# Patient Record
Sex: MALE | Race: WHITE | NOT HISPANIC OR LATINO | Employment: FULL TIME | URBAN - METROPOLITAN AREA
[De-identification: names, ages, dates, MRNs, and addresses within clinical notes are randomized per-mention and may not be internally consistent; named-entity substitution may affect disease eponyms.]

---

## 2017-05-07 ENCOUNTER — OFFICE VISIT (OUTPATIENT)
Dept: URGENT CARE | Facility: MEDICAL CENTER | Age: 52
End: 2017-05-07
Payer: COMMERCIAL

## 2017-05-07 PROCEDURE — 94640 AIRWAY INHALATION TREATMENT: CPT

## 2017-05-07 PROCEDURE — 99283 EMERGENCY DEPT VISIT LOW MDM: CPT

## 2017-05-07 PROCEDURE — G0382 LEV 3 HOSP TYPE B ED VISIT: HCPCS

## 2019-07-02 ENCOUNTER — OFFICE VISIT (OUTPATIENT)
Dept: URGENT CARE | Facility: MEDICAL CENTER | Age: 54
End: 2019-07-02
Payer: COMMERCIAL

## 2019-07-02 VITALS
RESPIRATION RATE: 20 BRPM | WEIGHT: 138.2 LBS | DIASTOLIC BLOOD PRESSURE: 75 MMHG | OXYGEN SATURATION: 99 % | HEART RATE: 128 BPM | HEIGHT: 69 IN | TEMPERATURE: 102.9 F | BODY MASS INDEX: 20.47 KG/M2 | SYSTOLIC BLOOD PRESSURE: 149 MMHG

## 2019-07-02 DIAGNOSIS — R50.9 FEVER, UNSPECIFIED FEVER CAUSE: Primary | ICD-10-CM

## 2019-07-02 PROCEDURE — 99213 OFFICE O/P EST LOW 20 MIN: CPT | Performed by: NURSE PRACTITIONER

## 2019-07-02 RX ORDER — IBUPROFEN 600 MG/1
600 TABLET ORAL ONCE
Status: DISCONTINUED | OUTPATIENT
Start: 2019-07-02 | End: 2019-07-02

## 2019-07-02 RX ORDER — IBUPROFEN 400 MG/1
400 TABLET ORAL ONCE
Status: COMPLETED | OUTPATIENT
Start: 2019-07-02 | End: 2019-07-02

## 2019-07-02 RX ADMIN — IBUPROFEN 400 MG: 400 TABLET ORAL at 08:22

## 2019-07-02 NOTE — LETTER
July 2, 2019     Patient: Tino Ramos   YOB: 1965   Date of Visit: 7/2/2019       To Whom it May Concern:    Bianka Dowling was seen in my clinic on 7/2/2019  He may return to work on 7/4/2019  If you have any questions or concerns, please don't hesitate to call  Sincerely,          MARY Riggs        CC: Jonathan Mosher

## 2019-07-02 NOTE — PATIENT INSTRUCTIONS
Alternate Tylenol and Motrin for fever  Tylenol is every 4 hours  Motrin is every 6 hours  Increase your fluid intake   If symptoms worsen, return to [facility] or go to the ER    Viral Syndrome   WHAT YOU NEED TO KNOW:   Viral syndrome is a term used for a viral infection that has no clear cause  Viruses are spread easily from person to person through the air and on shared items  DISCHARGE INSTRUCTIONS:   Call 911 for the following:   · You have a seizure  · You cannot be woken  · You have chest pain or trouble breathing  Return to the emergency department if:   · You have a stiff neck, a bad headache, and sensitivity to light  · You feel weak, dizzy, or confused  · You stop urinating or urinate a lot less than normal      · You cough up blood or thick, yellow or green, mucus  · You have severe abdominal pain or your abdomen is larger than usual   Contact your healthcare provider if:   · Your symptoms do not get better with treatment, or get worse, after 3 days  · You have a rash or ear pain  · You have burning when you urinate  · You have questions or concerns about your condition or care  Medicines: You may  need any of the following:  · Acetaminophen  decreases pain and fever  It is available without a doctor's order  Ask how much medicine to take and how often to take it  Follow directions  Acetaminophen can cause liver damage if not taken correctly  · NSAIDs , such as ibuprofen, help decrease swelling, pain, and fever  NSAIDs can cause stomach bleeding or kidney problems in certain people  If you take blood thinner medicine, always ask your healthcare provider if NSAIDs are safe for you  Always read the medicine label and follow directions  · Cold medicine  helps decrease swelling, control a cough, and relieve chest or nasal congestion  · Saline nasal spray  helps decrease nasal congestion  · Take your medicine as directed    Contact your healthcare provider if you think your medicine is not helping or if you have side effects  Tell him of her if you are allergic to any medicine  Keep a list of the medicines, vitamins, and herbs you take  Include the amounts, and when and why you take them  Bring the list or the pill bottles to follow-up visits  Carry your medicine list with you in case of an emergency  Manage your symptoms:   · Drink liquids as directed  to prevent dehydration  Ask how much liquid to drink each day and which liquids are best for you  Ask if you should drink an oral rehydration solution (ORS)  An ORS has the right amounts of water, salts, and sugar you need to replace body fluids  This may help prevent dehydration caused by vomiting or diarrhea  Do not drink liquids with caffeine  Drinks with caffeine can make dehydration worse  · Get plenty of rest  to help your body heal  Take naps throughout the day  Ask your healthcare provider when you can return to work and your normal activities  · Use a cool mist humidifier  to help you breathe easier if you have nasal or chest congestion  Ask your healthcare provider how to use a cool mist humidifier  · Eat honey or use cough drops  to help decrease throat discomfort  Ask your healthcare provider how much honey you should eat each day  Cough drops are available without a doctor's order  Follow directions for taking cough drops  · Do not smoke and stay away from others who smoke  Nicotine and other chemicals in cigarettes and cigars can cause lung damage  Smoking can also delay healing  Ask your healthcare provider for information if you currently smoke and need help to quit  E-cigarettes or smokeless tobacco still contain nicotine  Talk to your healthcare provider before you use these products  · Wash your hands frequently  to prevent the spread of germs to others  Use soap and water  Use gel hand  when soap and water are not available   Wash your hands after you use the bathroom, cough, or sneeze  Wash your hands before you prepare or eat food  Follow up with your healthcare provider as directed:  Write down your questions so you remember to ask them during your visits  © 2017 [employer] Information is for End User's use only and may not be sold, redistributed or otherwise used for commercial purposes  All illustrations and images included in CareNotes® are the copyrighted property of [employer]  or [employer]  The above information is an  only  It is not intended as medical advice for individual conditions or treatments  Talk to your doctor, nurse or pharmacist before following any medical regimen to see if it is safe and effective for you

## 2019-07-02 NOTE — PROGRESS NOTES
[facility]        NAME: Skylar Mina is a 48 y o  male  : 1965    MRN: [id number]  DATE: 2019  TIME: 8:50 AM    Assessment and Plan   Fever, unspecified fever cause [R50 9]  1  Fever, unspecified fever cause  ibuprofen (MOTRIN) tablet 400 mg    DISCONTINUED: ibuprofen (MOTRIN) tablet 600 mg         Patient Instructions   Alternate Tylenol and Motrin for fever  Tylenol is every 4 hours  Motrin is every 6 hours  Increase your fluid intake   If symptoms worsen, return to [facility] or go to the ER    Follow up with PCP in 3-5 days  Proceed to  ER if symptoms worsen  Chief Complaint     Chief Complaint   Patient presents with    Insect Bite     X 3 days ago fevers, body aches, chills, nausea and noticed 3 bug bites on upper back,          History of Present Illness       Patient is a 48year old male presenting with fever, chills, and bodyaches since   He reports having 3 insect bites on his back that he believes were from a red ant  The bites occurred on Thursday  MaxT 104  Associated with nausea  Denies headache, ear pain, sore throat, abdominal pain, V/D  Chronic cough that has not worsened  He is a smoker  Tolerating PO intake  He is taking Tylenol- last dose 4 am        Review of Systems   Review of Systems   Constitutional: Positive for appetite change, chills and fever  Negative for activity change  HENT: Negative for congestion, ear pain and sore throat  Respiratory: Positive for cough  Negative for chest tightness and shortness of breath  Cardiovascular: Negative for chest pain  Gastrointestinal: Positive for nausea  Negative for abdominal pain, diarrhea and vomiting  Musculoskeletal: Positive for myalgias  Negative for back pain  Skin: Positive for color change  Negative for rash and wound  Neurological: Negative for dizziness, numbness and headaches  Current Medications     No current outpatient medications on file    No current facility-administered medications for this visit  Current Allergies     Allergies as of 07/02/2019    (No Known Allergies)            The following portions of the patient's history were reviewed and updated as appropriate: allergies, current medications, past family history, past medical history, past social history, past surgical history and problem list      History reviewed  No pertinent past medical history  History reviewed  No pertinent surgical history  History reviewed  No pertinent family history  Medications have been verified  Objective   /75   Pulse (!) 128   Temp (!) 102 9 °F (39 4 °C)   Resp 20   Ht 5' 9" (1 753 m)   Wt 62 7 kg (138 lb 3 2 oz)   SpO2 99%   BMI 20 41 kg/m²        Physical Exam     Physical Exam   Constitutional: He is oriented to person, place, and time  He appears well-developed and well-nourished  He is active  No distress  HENT:   Head: Normocephalic  Right Ear: Tympanic membrane, external ear and ear canal normal    Left Ear: Tympanic membrane, external ear and ear canal normal    Nose: Nose normal    Mouth/Throat: Oropharynx is clear and moist    Cardiovascular: Regular rhythm, S1 normal, S2 normal and normal heart sounds  Tachycardia present  Pulmonary/Chest: Effort normal  No respiratory distress  He has decreased breath sounds  He has no wheezes  He has no rhonchi  He has no rales  Abdominal: Normal appearance and bowel sounds are normal  There is no tenderness  Neurological: He is alert and oriented to person, place, and time  GCS eye subscore is 4  GCS verbal subscore is 5  GCS motor subscore is 6  Skin: Skin is warm and dry

## 2020-05-14 DIAGNOSIS — I10 ESSENTIAL HYPERTENSION: Primary | ICD-10-CM

## 2020-05-14 RX ORDER — AMLODIPINE BESYLATE 5 MG/1
5 TABLET ORAL DAILY
Qty: 30 TABLET | Refills: 5 | Status: SHIPPED | OUTPATIENT
Start: 2020-05-14 | End: 2020-09-12

## 2020-06-02 DIAGNOSIS — I10 ESSENTIAL HYPERTENSION: Primary | ICD-10-CM

## 2020-06-02 RX ORDER — LISINOPRIL 5 MG/1
5 TABLET ORAL DAILY
Qty: 90 TABLET | Refills: 0 | Status: SHIPPED | OUTPATIENT
Start: 2020-06-02 | End: 2020-08-25

## 2020-08-24 DIAGNOSIS — I10 ESSENTIAL HYPERTENSION: ICD-10-CM

## 2020-08-25 RX ORDER — LISINOPRIL 5 MG/1
TABLET ORAL
Qty: 90 TABLET | Refills: 0 | Status: SHIPPED | OUTPATIENT
Start: 2020-08-25 | End: 2021-03-22 | Stop reason: SDUPTHER

## 2020-09-12 DIAGNOSIS — I10 ESSENTIAL HYPERTENSION: ICD-10-CM

## 2020-09-12 RX ORDER — AMLODIPINE BESYLATE 5 MG/1
TABLET ORAL
Qty: 90 TABLET | Refills: 2 | Status: SHIPPED | OUTPATIENT
Start: 2020-09-12 | End: 2020-10-07

## 2020-10-07 DIAGNOSIS — I10 ESSENTIAL HYPERTENSION: ICD-10-CM

## 2020-10-07 RX ORDER — AMLODIPINE BESYLATE 5 MG/1
TABLET ORAL
Qty: 90 TABLET | Refills: 3 | Status: SHIPPED | OUTPATIENT
Start: 2020-10-07 | End: 2021-03-22 | Stop reason: SDUPTHER

## 2020-10-26 ENCOUNTER — TELEMEDICINE (OUTPATIENT)
Dept: FAMILY MEDICINE CLINIC | Facility: CLINIC | Age: 55
End: 2020-10-26
Payer: COMMERCIAL

## 2020-10-26 DIAGNOSIS — F41.9 ANXIETY: Primary | ICD-10-CM

## 2020-10-26 PROCEDURE — 99442 PR PHYS/QHP TELEPHONE EVALUATION 11-20 MIN: CPT | Performed by: INTERNAL MEDICINE

## 2020-10-26 RX ORDER — ESCITALOPRAM OXALATE 10 MG/1
10 TABLET ORAL
Qty: 30 TABLET | Refills: 1 | Status: SHIPPED | OUTPATIENT
Start: 2020-10-26 | End: 2020-12-18

## 2020-11-17 ENCOUNTER — HOSPITAL ENCOUNTER (EMERGENCY)
Facility: HOSPITAL | Age: 55
Discharge: HOME | End: 2020-11-18
Attending: EMERGENCY MEDICINE | Admitting: EMERGENCY MEDICINE
Payer: COMMERCIAL

## 2020-11-17 VITALS
WEIGHT: 145 LBS | HEART RATE: 80 BPM | SYSTOLIC BLOOD PRESSURE: 153 MMHG | BODY MASS INDEX: 21.41 KG/M2 | DIASTOLIC BLOOD PRESSURE: 82 MMHG | RESPIRATION RATE: 18 BRPM | TEMPERATURE: 97.5 F | OXYGEN SATURATION: 100 %

## 2020-11-17 DIAGNOSIS — F10.929 ALCOHOL INTOXICATION (HCC): ICD-10-CM

## 2020-11-17 DIAGNOSIS — S09.90XA INJURY OF HEAD, INITIAL ENCOUNTER: ICD-10-CM

## 2020-11-17 DIAGNOSIS — S01.01XA LACERATION OF SCALP, INITIAL ENCOUNTER: Primary | ICD-10-CM

## 2020-11-17 PROCEDURE — 12002 RPR S/N/AX/GEN/TRNK2.6-7.5CM: CPT | Performed by: EMERGENCY MEDICINE

## 2020-11-17 PROCEDURE — 99284 EMERGENCY DEPT VISIT MOD MDM: CPT

## 2020-11-17 PROCEDURE — 99282 EMERGENCY DEPT VISIT SF MDM: CPT | Performed by: EMERGENCY MEDICINE

## 2020-11-17 PROCEDURE — 93005 ELECTROCARDIOGRAM TRACING: CPT

## 2020-11-18 ENCOUNTER — APPOINTMENT (EMERGENCY)
Dept: CT IMAGING | Facility: HOSPITAL | Age: 55
End: 2020-11-18
Payer: COMMERCIAL

## 2020-11-18 LAB
ATRIAL RATE: 89 BPM
QRS AXIS: 79 DEGREES
QRSD INTERVAL: 106 MS
QT INTERVAL: 380 MS
QTC INTERVAL: 424 MS
T WAVE AXIS: 88 DEGREES
VENTRICULAR RATE: 75 BPM

## 2020-11-18 PROCEDURE — G1004 CDSM NDSC: HCPCS

## 2020-11-18 PROCEDURE — 93010 ELECTROCARDIOGRAM REPORT: CPT | Performed by: INTERNAL MEDICINE

## 2020-11-18 PROCEDURE — 70450 CT HEAD/BRAIN W/O DYE: CPT

## 2020-12-18 DIAGNOSIS — F41.9 ANXIETY: ICD-10-CM

## 2020-12-18 RX ORDER — ESCITALOPRAM OXALATE 10 MG/1
TABLET ORAL
Qty: 90 TABLET | Refills: 1 | Status: SHIPPED | OUTPATIENT
Start: 2020-12-18 | End: 2021-01-12

## 2021-01-12 DIAGNOSIS — F41.9 ANXIETY: ICD-10-CM

## 2021-01-12 RX ORDER — ESCITALOPRAM OXALATE 10 MG/1
TABLET ORAL
Qty: 90 TABLET | Refills: 2 | Status: SHIPPED | OUTPATIENT
Start: 2021-01-12 | End: 2021-02-07

## 2021-02-06 DIAGNOSIS — F41.9 ANXIETY: ICD-10-CM

## 2021-02-07 RX ORDER — ESCITALOPRAM OXALATE 10 MG/1
TABLET ORAL
Qty: 90 TABLET | Refills: 1 | Status: SHIPPED | OUTPATIENT
Start: 2021-02-07

## 2021-03-22 ENCOUNTER — TELEPHONE (OUTPATIENT)
Dept: FAMILY MEDICINE CLINIC | Facility: CLINIC | Age: 56
End: 2021-03-22

## 2021-03-22 DIAGNOSIS — I10 ESSENTIAL HYPERTENSION: ICD-10-CM

## 2021-03-22 RX ORDER — LISINOPRIL 5 MG/1
5 TABLET ORAL DAILY
Qty: 90 TABLET | Refills: 0 | Status: SHIPPED | OUTPATIENT
Start: 2021-03-22 | End: 2021-04-16

## 2021-03-22 RX ORDER — AMLODIPINE BESYLATE 5 MG/1
5 TABLET ORAL DAILY
Qty: 90 TABLET | Refills: 0 | Status: SHIPPED | OUTPATIENT
Start: 2021-03-22

## 2021-03-22 NOTE — TELEPHONE ENCOUNTER
Wife called and stated that he is in need of the following meds      lisinopril (ZESTRIL) 5 mg tablet  amLODIPine (NORVASC) 5 mg tablet  She stated that he is unable to come into the office at this time and unable to receive messages on his phone

## 2021-04-16 DIAGNOSIS — I10 ESSENTIAL HYPERTENSION: ICD-10-CM

## 2021-04-16 RX ORDER — LISINOPRIL 5 MG/1
TABLET ORAL
Qty: 90 TABLET | Refills: 0 | Status: SHIPPED | OUTPATIENT
Start: 2021-04-16 | End: 2021-07-12

## 2021-04-16 NOTE — TELEPHONE ENCOUNTER
LMOM to inform patient that the medication was called  in and to call the office to schedule a appt ying everett LPN

## 2021-07-12 DIAGNOSIS — I10 ESSENTIAL HYPERTENSION: ICD-10-CM

## 2021-07-12 RX ORDER — LISINOPRIL 5 MG/1
TABLET ORAL
Qty: 90 TABLET | Refills: 0 | Status: SHIPPED | OUTPATIENT
Start: 2021-07-12

## 2021-07-12 NOTE — TELEPHONE ENCOUNTER
Please call this patient as per Dr Cummings Cassette and have them schedule an appt to be seen in the office

## 2021-07-27 ENCOUNTER — TELEPHONE (OUTPATIENT)
Dept: FAMILY MEDICINE CLINIC | Facility: CLINIC | Age: 56
End: 2021-07-27

## 2022-06-16 ENCOUNTER — TELEPHONE (OUTPATIENT)
Dept: FAMILY MEDICINE CLINIC | Facility: CLINIC | Age: 57
End: 2022-06-16

## 2022-06-16 ENCOUNTER — NURSE TRIAGE (OUTPATIENT)
Dept: OTHER | Facility: OTHER | Age: 57
End: 2022-06-16

## 2022-06-16 NOTE — TELEPHONE ENCOUNTER
Patient was not tested for COVID-19, but was exposed at work and his wife is COVID positive  He has mild head cold symptoms and would like to know if he could have antiviral medication  Patient would like a call back to advise  Reason for Disposition   [1] COVID-19 infection suspected by caller or triager AND [2] mild symptoms (cough, fever, or others) AND [3] has not gotten tested yet    Protocols used: CORONAVIRUS (COVID-19) DIAGNOSED OR SUSPECTED-ADULT-OH    1  Were you within 6 feet or less, for up to 15 minutes or more with a person that has a confirmed COVID-19 test? Yes  2  What was the date of your exposure? Lives with, at work   3  Are you experiencing any symptoms attributed to the virus?  (Assess for SOB, cough, fever, difficulty breathing) Head cold symptoms, onset 6/12/22  4  HIGH RISK: Do you have any history heart or lung conditions, weakened immune system, diabetes, Asthma, CHF, HIV, COPD, Chemo, renal failure, sickle cell, etc? Denies   5  PREGNANCY: Are you pregnant or did you recently give birth?  N/A  6  VACCINE: "Have you gotten the COVID-19 vaccine?" If Yes ask: "Which one, how many shots, when did you get it?" Denies

## 2022-06-16 NOTE — TELEPHONE ENCOUNTER
Wife called and stated pamela stated to her that he has a cough, runny nose and a post nasal drip  that just won't go away , she stated that he has been mowing very high grass lately she would like to know what you recommend

## 2022-06-16 NOTE — TELEPHONE ENCOUNTER
Regarding: COVID, [facility], runny nose, cough  ----- Message from Luz Rodriguez sent at 6/16/2022  8:15 AM EDT -----  "My  has a runny nose and cough and I was wondering if the doctor could call in the anti-viral meds for COVID; he was not tested for COVID but was exposed to myself and people at work "

## 2022-06-17 NOTE — TELEPHONE ENCOUNTER
Spoke to wife and pt stated that his cough is going away and he feels better and does not want to do a virtual visit

## 2022-09-23 ENCOUNTER — TELEPHONE (OUTPATIENT)
Dept: FAMILY MEDICINE CLINIC | Facility: CLINIC | Age: 57
End: 2022-09-23

## 2022-09-23 ENCOUNTER — OFFICE VISIT (OUTPATIENT)
Dept: URGENT CARE | Facility: CLINIC | Age: 57
End: 2022-09-23
Payer: COMMERCIAL

## 2022-09-23 VITALS
DIASTOLIC BLOOD PRESSURE: 85 MMHG | RESPIRATION RATE: 16 BRPM | TEMPERATURE: 98.8 F | SYSTOLIC BLOOD PRESSURE: 173 MMHG | HEART RATE: 77 BPM | OXYGEN SATURATION: 94 %

## 2022-09-23 DIAGNOSIS — F41.9 ANXIETY: Primary | ICD-10-CM

## 2022-09-23 PROCEDURE — 99213 OFFICE O/P EST LOW 20 MIN: CPT | Performed by: FAMILY MEDICINE

## 2022-09-23 NOTE — TELEPHONE ENCOUNTER
He needs to be seen for medication  Not seen for past 3 years    See if he can be seen this afternoon

## 2022-09-23 NOTE — TELEPHONE ENCOUNTER
Patient came into office with wife, states was seen at urgent care(9-23-22) for anxiety was told at urgent care could not give him anything due to everything checked out ok  States been having anxiety for last 3 weeks, wakes up early morning hours with a cough makes him unable to catch breathe and is shaking   Wanted to know if he could have something and if so would like sent to [place] [address]     Macie Cortés

## 2022-09-23 NOTE — PROGRESS NOTES
[facility]        NAME: Leah Kumar is a 49 y o  male  : 1965    MRN: [id number]  DATE: 2022  TIME: 2:45 PM    Assessment and Plan   Anxiety [F41 9]  1  Anxiety       Consider panic attacks, alcohol abuse, COPD exacerbation    Patient Instructions       Should f/u with his PCP  Proceed to  ER if symptoms worsen  Chief Complaint     Chief Complaint   Patient presents with    Anxiety     Pt is having anxiety- pt is waking up at night and having  trouble  in the middle of the night- when he wakes up and cannot catch his breath  He than wanders all night because he cannot fall back to sleep  History of Present Illness       Kym Fortune says he is here for anxiety  He says he has been waking up every night between 1 and 3 am and feels edgy  Needs to get up and walk around  Can sometimes get back to sleep  No associated chest pain, palpitations, shortness of breath  He does notice a  cough at that time  He has a chronic cough attributed to smoking  He does not feel short of breath or cough during the day  No SARAH  Able to perform his job function at   He has a history of alcohol abuse  He drinks over 6 beers a night before going to bed  He has a history of anxiety with previous medication  Not taking currently  Review of Systems   Review of Systems   Constitutional: Negative for chills and fever  HENT: Negative for congestion  Cardiovascular: Negative for palpitations  Gastrointestinal: Negative for abdominal pain  Neurological: Negative for dizziness           Current Medications       Current Outpatient Medications:     amLODIPine (NORVASC) 5 mg tablet, Take 1 tablet (5 mg total) by mouth daily (Patient not taking: Reported on 2022), Disp: 90 tablet, Rfl: 0    escitalopram (LEXAPRO) 10 mg tablet, TAKE 1 TABLET BY MOUTH EVERY DAY AFTER DINNER (Patient not taking: Reported on 2022), Disp: 90 tablet, Rfl: 1    lisinopril (ZESTRIL) 5 mg tablet, TAKE 1 TABLET BY MOUTH EVERY DAY (Patient not taking: Reported on 9/23/2022), Disp: 90 tablet, Rfl: 0    Current Allergies     Allergies as of 09/23/2022    (No Known Allergies)            The following portions of the patient's history were reviewed and updated as appropriate: allergies, current medications, past family history, past medical history, past social history, past surgical history and problem list      Past Medical History:   Diagnosis Date    Hypertension        No past surgical history on file  No family history on file  Medications have been verified  Objective   BP (!) 173/85   Pulse 77   Temp 98 8 °F (37 1 °C)   Resp 16   SpO2 94%        Physical Exam     Physical Exam  Constitutional:       General: He is not in acute distress  Appearance: Normal appearance  He is not ill-appearing  Cardiovascular:      Heart sounds: Normal heart sounds  Pulmonary:      Effort: Pulmonary effort is normal       Comments: Diffusely decreased breath sounds but clear  No respiratory distress  Lymphadenopathy:      Cervical: No cervical adenopathy  Neurological:      Mental Status: He is alert

## 2022-09-27 ENCOUNTER — OFFICE VISIT (OUTPATIENT)
Dept: FAMILY MEDICINE CLINIC | Facility: CLINIC | Age: 57
End: 2022-09-27
Payer: COMMERCIAL

## 2022-09-27 VITALS
OXYGEN SATURATION: 97 % | BODY MASS INDEX: 20.29 KG/M2 | HEART RATE: 68 BPM | SYSTOLIC BLOOD PRESSURE: 176 MMHG | TEMPERATURE: 98.8 F | WEIGHT: 137 LBS | DIASTOLIC BLOOD PRESSURE: 94 MMHG | HEIGHT: 69 IN

## 2022-09-27 DIAGNOSIS — R00.2 PALPITATION: ICD-10-CM

## 2022-09-27 DIAGNOSIS — Z12.5 SCREENING FOR PROSTATE CANCER: ICD-10-CM

## 2022-09-27 DIAGNOSIS — F41.9 ANXIETY: ICD-10-CM

## 2022-09-27 DIAGNOSIS — I10 ESSENTIAL HYPERTENSION: ICD-10-CM

## 2022-09-27 DIAGNOSIS — Z00.00 ROUTINE MEDICAL EXAM: Primary | ICD-10-CM

## 2022-09-27 DIAGNOSIS — Z00.00 ANNUAL PHYSICAL EXAM: ICD-10-CM

## 2022-09-27 DIAGNOSIS — F41.0 PANIC ATTACKS: ICD-10-CM

## 2022-09-27 DIAGNOSIS — I10 PRIMARY HYPERTENSION: ICD-10-CM

## 2022-09-27 PROCEDURE — 3725F SCREEN DEPRESSION PERFORMED: CPT | Performed by: FAMILY MEDICINE

## 2022-09-27 PROCEDURE — 93000 ELECTROCARDIOGRAM COMPLETE: CPT | Performed by: FAMILY MEDICINE

## 2022-09-27 PROCEDURE — 99386 PREV VISIT NEW AGE 40-64: CPT | Performed by: FAMILY MEDICINE

## 2022-09-27 RX ORDER — AMLODIPINE BESYLATE 5 MG/1
5 TABLET ORAL DAILY
Qty: 90 TABLET | Refills: 0 | Status: SHIPPED | OUTPATIENT
Start: 2022-09-27

## 2022-09-27 RX ORDER — ESCITALOPRAM OXALATE 10 MG/1
10 TABLET ORAL
Qty: 30 TABLET | Refills: 2 | Status: SHIPPED | OUTPATIENT
Start: 2022-09-27 | End: 2022-10-27

## 2022-09-27 NOTE — ASSESSMENT & PLAN NOTE
His  BP  Has  Been  Pretty  High  He  Stopped   His  meds  3 years  Ago    And  State s that  He  Does  Not  Like to  Take  meds     Discussed  Diet  counseled   For  Smoking  Keep  The  BP  Log  Started  On  Amlodipine 5 mg  Come  In  For  The  Nurse  Clinic   For  BP  Heck and  Dose  Adjustments

## 2022-09-27 NOTE — PATIENT INSTRUCTIONS
Wellness Visit for Adults   AMBULATORY CARE:   A wellness visit  is when you see your healthcare provider to get screened for health problems  Your healthcare provider will also give you advice on how to stay healthy  Write down your questions so you remember to ask them  Ask your healthcare provider how often you should have a wellness visit  What happens at a wellness visit:  Your healthcare provider will ask about your health, and your family history of health problems  This includes high blood pressure, heart disease, and cancer  He or she will ask if you have symptoms that concern you, if you smoke, and about your mood  You may also be asked about your intake of medicines, supplements, food, and alcohol  Any of the following may be done:  · Your weight  will be checked  Your height may also be checked so your body mass index (BMI) can be calculated  Your BMI shows if you are at a healthy weight  · Your blood pressure  and heart rate will be checked  Your temperature may also be checked  · Blood and urine tests  may be done  Blood tests may be done to check your cholesterol levels  Abnormal cholesterol levels increase your risk for heart disease and stroke  You may also need a blood or urine test to check for diabetes if you are at increased risk  Urine tests may be done to look for signs of an infection or kidney disease  · A physical exam  includes checking your heartbeat and lungs with a stethoscope  Your healthcare provider may also check your skin to look for sun damage  · Screening tests  may be recommended  A screening test is done to check for diseases that may not cause symptoms  The screening tests you may need depend on your age, gender, family history, and lifestyle habits  For example, colorectal screening may be recommended if you are 48years old or older  Screening tests you need if you are a woman:   · A Pap smear  is used to screen for cervical cancer   Pap smears are usually done every 3 to 5 years depending on your age  You may need them more often if you have had abnormal Pap smear test results in the past  Ask your healthcare provider how often you should have a Pap smear  · A mammogram  is an x-ray of your breasts to screen for breast cancer  Experts recommend mammograms every 2 years starting at age 48 years  You may need a mammogram at age 52 years or younger if you have an increased risk for breast cancer  Talk to your healthcare provider about when you should start having mammograms and how often you need them  Vaccines you may need:   · Get an influenza vaccine  every year  The influenza vaccine protects you from the flu  Several types of viruses cause the flu  The viruses change over time, so new vaccines are made each year  · Get a tetanus-diphtheria (Td) booster vaccine  every 10 years  This vaccine protects you against tetanus and diphtheria  Tetanus is a severe infection that may cause painful muscle spasms and lockjaw  Diphtheria is a severe bacterial infection that causes a thick covering in the back of your mouth and throat  · Get a human papillomavirus (HPV) vaccine  if you are female and aged 23 to 32 or male 23 to 24 and never received it  This vaccine protects you from HPV infection  HPV is the most common infection spread by sexual contact  HPV may also cause vaginal, penile, and anal cancers  · Get a pneumococcal vaccine  if you are aged 72 years or older  The pneumococcal vaccine is an injection given to protect you from pneumococcal disease  Pneumococcal disease is an infection caused by pneumococcal bacteria  The infection may cause pneumonia, meningitis, or an ear infection  · Get a shingles vaccine  if you are 60 or older, even if you have had shingles before  The shingles vaccine is an injection to protect you from the varicella-zoster virus  This is the same virus that causes chickenpox   Shingles is a painful rash that develops in people who had chickenpox or have been exposed to the virus  How to eat healthy:  My Plate is a model for planning healthy meals  It shows the types and amounts of foods that should go on your plate  Fruits and vegetables make up about half of your plate, and grains and protein make up the other half  A serving of dairy is included on the side of your plate  The amount of calories and serving sizes you need depends on your age, gender, weight, and height  Examples of healthy foods are listed below:  · Eat a variety of vegetables  such as dark green, red, and orange vegetables  You can also include canned vegetables low in sodium (salt) and frozen vegetables without added butter or sauces  · Eat a variety of fresh fruits , canned fruit in 100% juice, frozen fruit, and dried fruit  · Include whole grains  At least half of the grains you eat should be whole grains  Examples include whole-wheat bread, wheat pasta, brown rice, and whole-grain cereals such as oatmeal     · Eat a variety of protein foods such as seafood (fish and shellfish), lean meat, and poultry without skin (turkey and chicken)  Examples of lean meats include pork leg, shoulder, or tenderloin, and beef round, sirloin, tenderloin, and extra lean ground beef  Other protein foods include eggs and egg substitutes, beans, peas, soy products, nuts, and seeds  · Choose low-fat dairy products such as skim or 1% milk or low-fat yogurt, cheese, and cottage cheese  · Limit unhealthy fats  such as butter, hard margarine, and shortening  Exercise:  Exercise at least 30 minutes per day on most days of the week  Some examples of exercise include walking, biking, dancing, and swimming  You can also fit in more physical activity by taking the stairs instead of the elevator or parking farther away from stores  Include muscle strengthening activities 2 days each week  Regular exercise provides many health benefits   It helps you manage your weight, and decreases your risk for type 2 diabetes, heart disease, stroke, and high blood pressure  Exercise can also help improve your mood  Ask your healthcare provider about the best exercise plan for you  General health and safety guidelines:   · Do not smoke  Nicotine and other chemicals in cigarettes and cigars can cause lung damage  Ask your healthcare provider for information if you currently smoke and need help to quit  E-cigarettes or smokeless tobacco still contain nicotine  Talk to your healthcare provider before you use these products  · Limit alcohol  A drink of alcohol is 12 ounces of beer, 5 ounces of wine, or 1½ ounces of liquor  · Lose weight, if needed  Being overweight increases your risk of certain health conditions  These include heart disease, high blood pressure, type 2 diabetes, and certain types of cancer  · Protect your skin  Do not sunbathe or use tanning beds  Use sunscreen with a SPF 15 or higher  Apply sunscreen at least 15 minutes before you go outside  Reapply sunscreen every 2 hours  Wear protective clothing, hats, and sunglasses when you are outside  · Drive safely  Always wear your seatbelt  Make sure everyone in your car wears a seatbelt  A seatbelt can save your life if you are in an accident  Do not use your cell phone when you are driving  This could distract you and cause an accident  Pull over if you need to make a call or send a text message  · Practice safe sex  Use latex condoms if are sexually active and have more than one partner  Your healthcare provider may recommend screening tests for sexually transmitted infections (STIs)  · Wear helmets, lifejackets, and protective gear  Always wear a helmet when you ride a bike or motorcycle, go skiing, or play sports that could cause a head injury  Wear protective equipment when you play sports  Wear a lifejacket when you are on a boat or doing water sports      © Copyright [employer] 2022 Information is for End User's use only and may not be sold, redistributed or otherwise used for commercial purposes  All illustrations and images included in CareNotes® are the copyrighted property of [employer]  or Ahsan Jimenez  The above information is an  only  It is not intended as medical advice for individual conditions or treatments  Talk to your doctor, nurse or pharmacist before following any medical regimen to see if it is safe and effective for you  Cigarette Smoking and Your Health   AMBULATORY CARE:   Risks to your health if you smoke:  Nicotine and other chemicals found in tobacco and e-cigarettes can damage every cell in your body  Even if you are a light smoker, you have an increased risk for cancer, heart disease, and lung disease  If you are pregnant or have diabetes, smoking increases your risk for complications  Nicotine can affect an adolescent's developing brain  This can lead to trouble thinking, learning, or paying attention  Benefits to your health if you stop smoking:   · You decrease respiratory symptoms such as coughing, wheezing, and shortness of breath  · You reduce your risk for cancers of the lung, mouth, throat, kidney, bladder, pancreas, stomach, and cervix  If you already have cancer, you increase the benefits of chemotherapy  You also reduce your risk for cancer returning or a second cancer from developing  · You reduce your risk for heart disease, blood clots, heart attack, and stroke  · You reduce your risk for lung infections, and diseases such as pneumonia, asthma, chronic bronchitis, and emphysema  · Your circulation improves  More oxygen can be delivered to your body  If you have diabetes, you lower your risk for complications, such as kidney, artery, and eye diseases  You also lower your risk for nerve damage  Nerve damage can lead to amputations, poor vision, and blindness      · You improve your body's ability to heal and to fight infections  · An adolescent can help his or her brain and body develop in a healthy way  Talk to your adolescent about all the health risks of nicotine  If you can, start talking about nicotine when your child is younger than 12 years  This may make it easier for him or her not to start using nicotine as a teenager or adult  Explain to him or her that it is best never to start  It can be hard to try to quit later  Benefits to the health of others if you stop smoking:  Tobacco is harmful to nonsmokers who breathe in your secondhand smoke  The following are ways the health of others around you may improve when you stop smoking:  · You lower the risks for lung cancer and heart disease in nonsmoking adults  · If you are pregnant, you lower the risk for miscarriage, early delivery, low birth weight, and stillbirth  You also lower your baby's risk for SIDS, obesity, developmental delay, and neurobehavioral problems, such as ADHD  · If you have children, you lower their risk for ear infections, colds, pneumonia, bronchitis, and asthma  Follow up with your doctor as directed:  Write down your questions so you remember to ask them during your visits  For support and more information:   · American [employer] Association  [address] [place] [address],[address]  [address]  Phone: [facility]  Phone: 8- 419 - 503-1405  Web Address: [employer]    · Smokefree  gov  Phone: 2- 199 - 566-9873  Web Address: www smokefree  [facility] 21 2022 Information is for End User's use only and may not be sold, redistributed or otherwise used for commercial purposes  All illustrations and images included in CareNotes® are the copyrighted property of [employer]  or [facility]   The above information is an  only  It is not intended as medical advice for individual conditions or treatments   Talk to your doctor, nurse or pharmacist before following any medical regimen to see if it is safe and effective for you  Cholesterol and Your Health   AMBULATORY CARE:   Cholesterol  is a waxy, fat-like substance  Your body uses cholesterol to make hormones and new cells, and to protect nerves  Cholesterol is made by your body  It also comes from certain foods you eat, such as meat and dairy products  Your healthcare provider can help you set goals for your cholesterol levels  He or she can help you create a plan to meet your goals  Cholesterol level goals: Your cholesterol level goals depend on your risk for heart disease, your age, and your other health conditions  The following are general guidelines:  · Total cholesterol  includes low-density lipoprotein (LDL), high-density lipoprotein (HDL), and triglyceride levels  The total cholesterol level should be lower than 200 mg/dL and is best at about 150 mg/dL  · LDL cholesterol  is called bad cholesterol  because it forms plaque in your arteries  As plaque builds up, your arteries become narrow, and less blood flows through  When plaque decreases blood flow to your heart, you may have chest pain  If plaque completely blocks an artery that brings blood to your heart, you may have a heart attack  Plaque can break off and form blood clots  Blood clots may block arteries in your brain and cause a stroke  The level should be less than 130 mg/dL and is best at about 100 mg/dL  · HDL cholesterol  is called good cholesterol  because it helps remove LDL cholesterol from your arteries  It does this by attaching to LDL cholesterol and carrying it to your liver  Your liver breaks down LDL cholesterol so your body can get rid of it  High levels of HDL cholesterol can help prevent a heart attack and stroke  Low levels of HDL cholesterol can increase your risk for heart disease, heart attack, and stroke  The level should be 60 mg/dL or higher  · Triglycerides  are a type of fat that store energy from foods you eat   High levels of triglycerides also cause plaque buildup  This can increase your risk for a heart attack or stroke  If your triglyceride level is high, your LDL cholesterol level may also be high  The level should be less than 150 mg/dL  Any of the following can increase your risk for high cholesterol:   · Smoking cigarettes    · Being overweight or obese, or not getting enough exercise    · Drinking large amounts of alcohol    · A medical condition such as hypertension (high blood pressure) or diabetes    · Certain genes passed from your parents to you    · Age older than 65 years    What you need to know about having your cholesterol levels checked: Adults 21to 39years of age should have their cholesterol levels checked every 4 to 6 years  Adults 45 years or older should have their cholesterol checked every 1 to 2 years  You may need your cholesterol checked more often, or at a younger age, if you have risk factors for heart disease  You may also need to have your cholesterol checked more often if you have other health conditions, such as diabetes  Blood tests are used to check cholesterol levels  Blood tests measure your levels of triglycerides, LDL cholesterol, and HDL cholesterol  How healthy fats affect your cholesterol levels:  Healthy fats, also called unsaturated fats, help lower LDL cholesterol and triglyceride levels  Healthy fats include the following:  · Monounsaturated fats  are found in foods such as olive oil, canola oil, avocado, nuts, and olives  · Polyunsaturated fats,  such as omega 3 fats, are found in fish, such as salmon, trout, and tuna  They can also be found in plant foods such as flaxseed, walnuts, and soybeans  How unhealthy fats affect your cholesterol levels:  Unhealthy fats increase LDL cholesterol and triglyceride levels  They are found in foods high in cholesterol, saturated fat, and trans fat:  · Cholesterol  is found in eggs, dairy, and meat      · Saturated fat  is found in butter, cheese, ice cream, whole milk, and coconut oil  Saturated fat is also found in meat, such as sausage, hot dogs, and bologna  · Trans fat  is found in liquid oils and is used in fried and baked foods  Foods that contain trans fats include chips, crackers, muffins, sweet rolls, microwave popcorn, and cookies  Treatment  for high cholesterol will also decrease your risk of heart disease, heart attack, and stroke  Treatment may include any of the following:  · Lifestyle changes  may include food, exercise, weight loss, and quitting smoking  You may also need to decrease the amount of alcohol you drink  Your healthcare provider will want you to start with lifestyle changes  Other treatment may be added if lifestyle changes are not enough  Your healthcare provider may recommend you work with a team to manage hyperlipidemia  The team may include medical experts such as a dietitian, an exercise or physical therapist, and a behavior therapist  Your family members may be included in helping you create lifestyle changes  · Medicines  may be given to lower your LDL cholesterol, triglyceride levels, or total cholesterol level  You may need medicines to lower your cholesterol if any of the following is true:    ? You have a history of stroke, TIA, unstable angina, or a heart attack  ? Your LDL cholesterol level is 190 mg/dL or higher  ? You are age 36 to 76 years, have diabetes or heart disease risk factors, and your LDL cholesterol is 70 mg/dL or higher  · Supplements  include fish oil, red yeast rice, and garlic  Fish oil may help lower your triglyceride and LDL cholesterol levels  It may also increase your HDL cholesterol level  Red yeast rice may help decrease your total cholesterol level and LDL cholesterol level  Garlic may help lower your total cholesterol level  Do not take any supplements without talking to your healthcare provider      Food changes you can make to lower your cholesterol levels:  A dietitian can help you create a healthy eating plan  He or she can show you how to read food labels and choose foods low in saturated fat, trans fats, and cholesterol  · Decrease the total amount of fat you eat  Choose lean meats, fat-free or 1% fat milk, and low-fat dairy products, such as yogurt and cheese  Try to limit or avoid red meats  Limit or do not eat fried foods or baked goods, such as cookies  · Replace unhealthy fats with healthy fats  Cook foods in olive oil or canola oil  Choose soft margarines that are low in saturated fat and trans fat  Seeds, nuts, and avocados are other examples of healthy fats  · Eat foods with omega-3 fats  Examples include salmon, tuna, mackerel, walnuts, and flaxseed  Eat fish 2 times per week  Pregnant women should not eat fish that have high levels of mercury, such as shark, swordfish, and jaclyn mackerel  · Increase the amount of high-fiber foods you eat  High-fiber foods can help lower your LDL cholesterol  Aim to get between 20 and 30 grams of fiber each day  Fruits and vegetables are high in fiber  Eat at least 5 servings each day  Other high-fiber foods are whole-grain or whole-wheat breads, pastas, or cereals, and brown rice  Eat 3 ounces of whole-grain foods each day  Increase fiber slowly  You may have abdominal discomfort, bloating, and gas if you add fiber to your diet too quickly  · Eat healthy protein foods  Examples include low-fat dairy products, skinless chicken and turkey, fish, and nuts  · Limit foods and drinks that are high in sugar  Your dietitian or healthcare provider can help you create daily limits for high-sugar foods and drinks  The limit may be lower if you have diabetes or another health condition  Limits can also help you lose weight if needed  Lifestyle changes you can make to lower your cholesterol levels:   · Maintain a healthy weight  Ask your healthcare provider what a healthy weight is for you   Ask him or her to help you create a weight loss plan if needed  Weight loss can decrease your total cholesterol and triglyceride levels  Weight loss may also help keep your blood pressure at a healthy level  · Be physically active throughout the day  Physical activity, such as exercise, can help lower your total cholesterol level and maintain a healthy weight  Physical activity can also help increase your HDL cholesterol level  Work with your healthcare provider to create an program that is right for you  Get at least 30 to 40 minutes of moderate physical activity most days of the week  Examples of exercise include brisk walking, swimming, or biking  Also include strength training at least 2 times each week  Your healthcare providers can help you create a physical activity plan  · Do not smoke  Nicotine and other chemicals in cigarettes and cigars can raise your cholesterol levels  Ask your healthcare provider for information if you currently smoke and need help to quit  E-cigarettes or smokeless tobacco still contain nicotine  Talk to your healthcare provider before you use these products  · Limit or do not drink alcohol  Alcohol can increase your triglyceride levels  Ask your healthcare provider before you drink alcohol  Ask how much is okay for you to drink in 24 hours or 1 week  Follow up with your doctor as directed:  Write down your questions so you remember to ask them during your visits  © [employer] 2022 Information is for End User's use only and may not be sold, redistributed or otherwise used for commercial purposes  All illustrations and images included in CareNotes® are the copyrighted property of [employer]  or Ahsan Jimenez  The above information is an  only  It is not intended as medical advice for individual conditions or treatments  Talk to your doctor, nurse or pharmacist before following any medical regimen to see if it is safe and effective for you

## 2022-09-27 NOTE — PROGRESS NOTES
ADULT ANNUAL [facility] CARE [place]    NAME: Ronald Parekh  AGE: 49 y o  SEX: male  : 1965     DATE: 2022     Assessment and Plan:routeine  Medical  Exam  And other  Health  Conditions  A s listed  Below  HIS  REPEAT BP  WAS  -  EKG  DONE   TODAY  CONSEDERING HIS  SYMPTOMS  OF  PALPITATIONS   AT  NIGHT   AT  TIMES   HE  ATTREBUTES  TO  PANIC  ATTACKS  EKG  SHOWING  ? LVH  UNDETERMINED   REPEAT BP  /98     Problem List Items Addressed This Visit        Cardiovascular and Mediastinum    Primary hypertension     His  BP  Has  Been  Pretty  High  He  Stopped   His  meds  3 years  Ago    And  State s that  He  Does  Not  Like to  Take  meds     Discussed  Diet  counseled   For  Smoking  Keep  The  BP  Log  Started  On  Amlodipine 5 mg  Come  In  For  The  Nurse  Clinic   For  BP  Heck and  Dose  Adjustments          Relevant Orders    Comprehensive metabolic panel    Lipid panel       Other    Anxiety    Relevant Orders    TSH, 3rd generation    Routine medical exam - Primary    Relevant Orders    CBC and differential    Panic attacks     Intermittent   He  Is  Requesting  zanax    No panic  Attacks  Currently   Advised him  To take a  Regular  Medication  Since  Prevention  Is  The  Key    reff to  Psychiatrist  Started  On  Laxapro  Return  Parameters  Discussed   He  Declined  At  Depression  Or  negative  Thoughts   No  suicidal  thoughts               Immunizations and preventive care screenings were discussed with patient today  Appropriate education was printed on patient's after visit summary  Discussed risks and benefits of prostate cancer screening  We discussed the controversial history of PSA screening for prostate cancer in the [place] as well as the risk of over detection and over treatment of prostate cancer by way of PSA screening    The patient understands that PSA blood testing is an imperfect way to screen for prostate cancer and that elevated PSA levels in the blood may also be caused by infection, inflammation, prostatic trauma or manipulation, urological procedures, or by benign prostatic enlargement  The role of the digital rectal examination in prostate cancer screening was also discussed and I discussed with him that there is large interobserver variability in the findings of digital rectal examination  Counseling:  · Dental Health: discussed importance of regular tooth brushing, flossing, and dental visits  Depression Screening and Follow-up Plan: Patient was screened for depression during today's encounter  They screened negative with a PHQ-2 score of 0  Tobacco Cessation Counseling: Tobacco cessation counseling was not provided  The patient is sincerely urged to quit consumption of tobacco  He is not ready to quit tobacco          No follow-ups on file  Chief Complaint:     Chief Complaint   Patient presents with    Panic Attack     Patient states he feels like he is "jumping out of his skin"  He states he is having panic attacks while trying to catch his breath  History of Present Illness:     Adult Annual Physical   Patient here for a comprehensive physical exam  The patient reports as  listed  Diet and Physical Activity  · Diet/Nutrition: poor diet  · Exercise: no formal exercise  Depression Screening  PHQ-2/9 Depression Screening    Little interest or pleasure in doing things: 0 - not at all  Feeling down, depressed, or hopeless: 0 - not at all  PHQ-2 Score: 0  PHQ-2 Interpretation: Negative depression screen       General Health  · Sleep: sleeps poorly  · Hearing: normal - bilateral   · Vision: no vision problems  · Dental: no dental visits for >1 year   Health  · Symptoms include: none     Review of Systems:     Review of Systems   Constitutional: Negative for activity change, chills and fatigue  HENT: Negative for congestion, ear pain and postnasal drip  Eyes: Negative for pain, discharge, redness and itching  Respiratory: Negative for cough and shortness of breath  Cardiovascular: Negative for chest pain, palpitations and leg swelling  HTN   Gastrointestinal: Negative for abdominal distention and abdominal pain  Endocrine: Negative for cold intolerance, heat intolerance, polydipsia and polyphagia  Genitourinary: Negative for dysuria and flank pain  Musculoskeletal: Positive for arthralgias  Negative for joint swelling  Skin: Negative for rash  Neurological: Negative for dizziness, seizures, numbness and headaches  Psychiatric/Behavioral: Negative for self-injury, sleep disturbance and suicidal ideas  The patient is nervous/anxious  Past Medical History:     Past Medical History:   Diagnosis Date    Hypertension       Past Surgical History:     History reviewed  No pertinent surgical history  Family History:     Family History   Problem Relation Age of Onset    Heart disease Father     Heart disease Brother       Social History:     Social History     Socioeconomic History    Marital status: /Civil Union     Spouse name: None    Number of children: None    Years of education: None    Highest education level: None   Occupational History    None   Tobacco Use    Smoking status: Current Every Day Smoker     Packs/day: 1 00    Smokeless tobacco: Never Used   Vaping Use    Vaping Use: Never used   Substance and Sexual Activity    Alcohol use:  Yes     Alcohol/week: 30 0 standard drinks     Types: 30 Cans of beer per week    Drug use: Never    Sexual activity: None   Other Topics Concern    None   Social History Narrative    Most recent tobacco use screenin2019      Do you currently or have you served in the [employer]:   No      Were you activated, into active duty, as a member of the [employer] or as a Reservist:   No      Exercise level:   None      Diet:   Regular      Has smoked since age: 18      Caffeine intake: Moderate      Sunscreen used routinely:   No      Smoke alarm in home:   Yes      Live alone or with others:   with others      Are there stairs in your home:   Yes    Per [employer]      Social Determinants of Health     Financial Resource Strain: Not on file   Food Insecurity: Not on file   Transportation Needs: Not on file   Physical Activity: Not on file   Stress: Not on file   Social Connections: Not on file   Intimate Partner Violence: Not on file   Housing Stability: Not on file      Current Medications:     Current Outpatient Medications   Medication Sig Dispense Refill    amLODIPine (NORVASC) 5 mg tablet Take 1 tablet (5 mg total) by mouth daily (Patient not taking: No sig reported) 90 tablet 0    escitalopram (LEXAPRO) 10 mg tablet TAKE 1 TABLET BY MOUTH EVERY DAY AFTER DINNER (Patient not taking: No sig reported) 90 tablet 1    lisinopril (ZESTRIL) 5 mg tablet TAKE 1 TABLET BY MOUTH EVERY DAY (Patient not taking: No sig reported) 90 tablet 0     No current facility-administered medications for this visit  Allergies:     No Known Allergies   Physical Exam:     BP (!) 176/94 (BP Location: Left arm, Patient Position: Sitting, Cuff Size: Standard)   Pulse 68   Temp 98 8 °F (37 1 °C) (Temporal)   Ht 5' 9" (1 753 m)   Wt 62 1 kg (137 lb)   SpO2 97%   BMI 20 23 kg/m²     Physical Exam  Vitals and nursing note reviewed  Constitutional:       General: He is not in acute distress  Appearance: Normal appearance  He is not ill-appearing, toxic-appearing or diaphoretic  HENT:      Head: Normocephalic and atraumatic  Nose: Nose normal  No congestion  Mouth/Throat:      Mouth: Mucous membranes are moist       Pharynx: No oropharyngeal exudate or posterior oropharyngeal erythema  Eyes:      Extraocular Movements: Extraocular movements intact  Conjunctiva/sclera: Conjunctivae normal       Pupils: Pupils are equal, round, and reactive to light     Cardiovascular: Rate and Rhythm: Normal rate and regular rhythm  Pulses: Normal pulses  Heart sounds: Normal heart sounds  No murmur heard  No gallop  Pulmonary:      Effort: Pulmonary effort is normal       Breath sounds: Normal breath sounds  No wheezing, rhonchi or rales  Abdominal:      General: There is no distension  Palpations: Abdomen is soft  There is no mass  Tenderness: There is no abdominal tenderness  There is no rebound  Musculoskeletal:      Cervical back: Normal range of motion and neck supple  Right lower leg: No edema  Left lower leg: No edema  Skin:     Findings: No erythema or rash  Neurological:      General: No focal deficit present  Mental Status: He is alert and oriented to person, place, and time     Psychiatric:         Mood and Affect: Mood normal          Behavior: Behavior normal           Sonia Dupree MD  [facility]

## 2022-09-27 NOTE — ASSESSMENT & PLAN NOTE
Intermittent   He  Is  Requesting  zanax    No panic  Attacks  Currently   Advised him  To take a  Regular  Medication  Since  Prevention  Is  The  Key    reff to  Psychiatrist  Started  On  Laxapro  Return  Parameters  Discussed   He  Declined  At  Depression  Or  negative  Thoughts   No  suicidal  thoughts

## 2022-09-28 ENCOUNTER — TELEPHONE (OUTPATIENT)
Dept: PSYCHIATRY | Facility: CLINIC | Age: 57
End: 2022-09-28

## 2022-09-28 NOTE — TELEPHONE ENCOUNTER
contacted patient in regards to referral in attempts to add pt to proper waitlist  spoke w  patient whom stated they are unsure and to add to waitlist if pcp meds do not work then they will remain on list if they do they will contact intake to be removed

## 2022-09-29 ENCOUNTER — APPOINTMENT (OUTPATIENT)
Dept: LAB | Facility: HOSPITAL | Age: 57
End: 2022-09-29
Payer: COMMERCIAL

## 2022-09-29 DIAGNOSIS — Z00.00 ROUTINE MEDICAL EXAM: ICD-10-CM

## 2022-09-29 DIAGNOSIS — Z12.5 SCREENING FOR PROSTATE CANCER: ICD-10-CM

## 2022-09-29 DIAGNOSIS — F41.9 ANXIETY: ICD-10-CM

## 2022-09-29 DIAGNOSIS — I10 PRIMARY HYPERTENSION: ICD-10-CM

## 2022-09-29 LAB
ALBUMIN SERPL BCP-MCNC: 4.6 G/DL (ref 3.5–5)
ALP SERPL-CCNC: 54 U/L (ref 34–104)
ALT SERPL W P-5'-P-CCNC: 22 U/L (ref 7–52)
ANION GAP SERPL CALCULATED.3IONS-SCNC: 8 MMOL/L (ref 4–13)
AST SERPL W P-5'-P-CCNC: 26 U/L (ref 13–39)
BASOPHILS # BLD AUTO: 0.05 THOUSANDS/ΜL (ref 0–0.1)
BASOPHILS NFR BLD AUTO: 1 % (ref 0–1)
BILIRUB SERPL-MCNC: 0.82 MG/DL (ref 0.2–1)
BUN SERPL-MCNC: 15 MG/DL (ref 5–25)
CALCIUM SERPL-MCNC: 9.8 MG/DL (ref 8.4–10.2)
CHLORIDE SERPL-SCNC: 99 MMOL/L (ref 96–108)
CHOLEST SERPL-MCNC: 208 MG/DL
CO2 SERPL-SCNC: 31 MMOL/L (ref 21–32)
CREAT SERPL-MCNC: 0.67 MG/DL (ref 0.6–1.3)
EOSINOPHIL # BLD AUTO: 0 THOUSAND/ΜL (ref 0–0.61)
EOSINOPHIL NFR BLD AUTO: 0 % (ref 0–6)
ERYTHROCYTE [DISTWIDTH] IN BLOOD BY AUTOMATED COUNT: 13.6 % (ref 11.6–15.1)
GFR SERPL CREATININE-BSD FRML MDRD: 108 ML/MIN/1.73SQ M
GLUCOSE P FAST SERPL-MCNC: 104 MG/DL (ref 65–99)
HCT VFR BLD AUTO: 45.7 % (ref 36.5–49.3)
HDLC SERPL-MCNC: 135 MG/DL
HGB BLD-MCNC: 15.4 G/DL (ref 12–17)
IMM GRANULOCYTES # BLD AUTO: 0.02 THOUSAND/UL (ref 0–0.2)
IMM GRANULOCYTES NFR BLD AUTO: 0 % (ref 0–2)
LDLC SERPL CALC-MCNC: 66 MG/DL (ref 0–100)
LYMPHOCYTES # BLD AUTO: 1.52 THOUSANDS/ΜL (ref 0.6–4.47)
LYMPHOCYTES NFR BLD AUTO: 22 % (ref 14–44)
MCH RBC QN AUTO: 32.3 PG (ref 26.8–34.3)
MCHC RBC AUTO-ENTMCNC: 33.7 G/DL (ref 31.4–37.4)
MCV RBC AUTO: 96 FL (ref 82–98)
MONOCYTES # BLD AUTO: 0.47 THOUSAND/ΜL (ref 0.17–1.22)
MONOCYTES NFR BLD AUTO: 7 % (ref 4–12)
NEUTROPHILS # BLD AUTO: 5.01 THOUSANDS/ΜL (ref 1.85–7.62)
NEUTS SEG NFR BLD AUTO: 70 % (ref 43–75)
NONHDLC SERPL-MCNC: 73 MG/DL
NRBC BLD AUTO-RTO: 0 /100 WBCS
PLATELET # BLD AUTO: 217 THOUSANDS/UL (ref 149–390)
PMV BLD AUTO: 10 FL (ref 8.9–12.7)
POTASSIUM SERPL-SCNC: 4.4 MMOL/L (ref 3.5–5.3)
PROT SERPL-MCNC: 7.4 G/DL (ref 6.4–8.4)
PSA SERPL-MCNC: 0.6 NG/ML (ref 0–4)
RBC # BLD AUTO: 4.77 MILLION/UL (ref 3.88–5.62)
SODIUM SERPL-SCNC: 138 MMOL/L (ref 135–147)
TRIGL SERPL-MCNC: 35 MG/DL
TSH SERPL DL<=0.05 MIU/L-ACNC: 1.5 UIU/ML (ref 0.45–4.5)
WBC # BLD AUTO: 7.07 THOUSAND/UL (ref 4.31–10.16)

## 2022-09-29 PROCEDURE — G0103 PSA SCREENING: HCPCS

## 2022-09-29 PROCEDURE — 84443 ASSAY THYROID STIM HORMONE: CPT

## 2022-09-29 PROCEDURE — 85025 COMPLETE CBC W/AUTO DIFF WBC: CPT

## 2022-09-29 PROCEDURE — 80053 COMPREHEN METABOLIC PANEL: CPT

## 2022-09-29 PROCEDURE — 80061 LIPID PANEL: CPT

## 2022-09-29 PROCEDURE — 36415 COLL VENOUS BLD VENIPUNCTURE: CPT

## 2022-09-30 ENCOUNTER — TELEPHONE (OUTPATIENT)
Dept: FAMILY MEDICINE CLINIC | Facility: CLINIC | Age: 57
End: 2022-09-30

## 2022-09-30 NOTE — TELEPHONE ENCOUNTER
----- Message from Gilda Moreno MD sent at 9/29/2022  8:07 PM EDT -----  Glucose  and  cholesterol  on  the  border   otherwise  rest  of  the  lobs  are   good    needs  to  watch  the  diet and  exercise   ----- Message -----  From: Lab, Background User  Sent: 9/29/2022  11:13 AM EDT  To: Gilda Moreno MD

## 2022-10-11 ENCOUNTER — OFFICE VISIT (OUTPATIENT)
Dept: FAMILY MEDICINE CLINIC | Facility: CLINIC | Age: 57
End: 2022-10-11
Payer: COMMERCIAL

## 2022-10-11 VITALS
HEIGHT: 69 IN | BODY MASS INDEX: 20.32 KG/M2 | HEART RATE: 63 BPM | DIASTOLIC BLOOD PRESSURE: 80 MMHG | OXYGEN SATURATION: 97 % | TEMPERATURE: 97.3 F | SYSTOLIC BLOOD PRESSURE: 164 MMHG | WEIGHT: 137.2 LBS

## 2022-10-11 DIAGNOSIS — I10 PRIMARY HYPERTENSION: ICD-10-CM

## 2022-10-11 DIAGNOSIS — R73.9 HYPERGLYCEMIA: ICD-10-CM

## 2022-10-11 DIAGNOSIS — F41.9 ANXIETY: ICD-10-CM

## 2022-10-11 DIAGNOSIS — F41.0 PANIC ATTACKS: ICD-10-CM

## 2022-10-11 DIAGNOSIS — I10 HYPERTENSION, UNSPECIFIED TYPE: Primary | ICD-10-CM

## 2022-10-11 DIAGNOSIS — E78.2 MIXED HYPERLIPIDEMIA: ICD-10-CM

## 2022-10-11 PROCEDURE — 99214 OFFICE O/P EST MOD 30 MIN: CPT | Performed by: FAMILY MEDICINE

## 2022-10-11 RX ORDER — LISINOPRIL 5 MG/1
5 TABLET ORAL DAILY
Qty: 90 TABLET | Refills: 0 | Status: SHIPPED | OUTPATIENT
Start: 2022-10-11 | End: 2023-01-09

## 2022-10-11 NOTE — ASSESSMENT & PLAN NOTE
His  Repeat BP  Is  164/84  Reviewed   His  BP  Log  His   Systolic  Numbers  Are  In  160es   In  Addition  To  Amlodipine  Added  Lisinopril  5  Mg  Asked  Him  To  Keep  The  BP log   Discussed  Diet   Will  F/u he  Feels  So  Much  better  Then  Before  He  Says

## 2022-10-11 NOTE — ASSESSMENT & PLAN NOTE
His  Cholesterol  Is  borderline  High    Discussed  Diet  And   Exercise      Will  F/u  With  Next  labs

## 2022-10-11 NOTE — PROGRESS NOTES
Assessment/Plan:as  below         Problem List Items Addressed This Visit        Cardiovascular and Mediastinum    Primary hypertension      His  Repeat BP  Is  164/84  Reviewed   His  BP  Log  His   Systolic  Numbers  Are  In  160es   In  Addition  To  Amlodipine  Added  Lisinopril  5  Mg  Asked  Him  To  Keep  The  BP log   Discussed  Diet   Will  F/u he  Feels  So  Much  better  Then  Before  He  Says          Relevant Medications    lisinopril (ZESTRIL) 5 mg tablet       Other    Anxiety     Much  Stable  Now   He  Says  He  Does  Not  Suffer  With  Panic  Attacks  Any  More    Discussed     Life  Style  modifications         Panic attacks    Hyperglycemia     His labs  Reviewed showing  Slightly  High  Glucose     Will  Check  a1c   Discussed  diet         Relevant Orders    HEMOGLOBIN A1C W/ EAG ESTIMATION    Mixed hyperlipidemia     His  Cholesterol  Is  borderline  High    Discussed  Diet  And   Exercise      Will  F/u  With  Next  labs          Relevant Orders    Lipid panel    Comprehensive metabolic panel      Other Visit Diagnoses     Hypertension, unspecified type    -  Primary    Relevant Medications    lisinopril (ZESTRIL) 5 mg tablet    Other Relevant Orders    CBC and differential    Lipid panel    UA (URINE) with reflex to Scope            Subjective:      Patient ID: Pete Bhakta is a 49 y o  male  HPI came  In  To  Follow up  On  His  Labs    HTN  anxiety  And  Panic  Attacks   He  Is  Doing  So  Much  Better  Then  Before his  Labs  Reviewed  And  Discussed   Showing  Slightly  High  Glucose  And  cholesterol    The following portions of the patient's history were reviewed and updated as appropriate:   Past Medical History:  He has a past medical history of Hypertension  ,  _______________________________________________________________________  Medical Problems:  does not have any pertinent problems on file ,  _______________________________________________________________________  Past Surgical History:   has no past surgical history on file ,  _______________________________________________________________________  Family History:  family history includes Heart disease in his brother and father ,  _______________________________________________________________________  Social History:   reports that he has been smoking  He has been smoking about 1 00 pack per day  He has never used smokeless tobacco  He reports current alcohol use of about 30 0 standard drinks of alcohol per week  He reports that he does not use drugs  ,  _______________________________________________________________________  Allergies:  has No Known Allergies     _______________________________________________________________________  Current Outpatient Medications   Medication Sig Dispense Refill   • amLODIPine (NORVASC) 5 mg tablet Take 1 tablet (5 mg total) by mouth daily 90 tablet 0   • escitalopram (LEXAPRO) 10 mg tablet Take 1 tablet (10 mg total) by mouth daily after dinner 30 tablet 2   • lisinopril (ZESTRIL) 5 mg tablet Take 1 tablet (5 mg total) by mouth daily 90 tablet 0   • lisinopril (ZESTRIL) 5 mg tablet TAKE 1 TABLET BY MOUTH EVERY DAY (Patient not taking: No sig reported) 90 tablet 0     No current facility-administered medications for this visit      _______________________________________________________________________  Review of Systems   Constitutional: Negative for chills and fatigue  HENT: Negative for congestion and postnasal drip  Eyes: Negative for pain, discharge and itching  Respiratory: Negative for cough and shortness of breath  Cardiovascular: Negative for chest pain, palpitations and leg swelling  Htn  hyperlipidemia   Gastrointestinal: Negative for abdominal distention and abdominal pain  Endocrine: Negative for cold intolerance, heat intolerance and polydipsia  Hyperglycemia   Genitourinary: Negative for dysuria and flank pain     Musculoskeletal: Negative for arthralgias and back pain  Skin: Negative for rash  Neurological: Negative for dizziness and headaches  Psychiatric/Behavioral: Negative for self-injury, sleep disturbance and suicidal ideas  The patient is not nervous/anxious  Objective:  Vitals:    10/11/22 1726   BP: 164/80   BP Location: Left arm   Patient Position: Sitting   Cuff Size: Standard   Pulse: 63   Temp: (!) 97 3 °F (36 3 °C)   TempSrc: Temporal   SpO2: 97%   Weight: 62 2 kg (137 lb 3 2 oz)   Height: 5' 9" (1 753 m)     Body mass index is 20 26 kg/m²  Physical Exam  Vitals and nursing note reviewed  Constitutional:       General: He is not in acute distress  Appearance: Normal appearance  He is not ill-appearing, toxic-appearing or diaphoretic  HENT:      Head: Normocephalic and atraumatic  Nose: Nose normal  No congestion  Mouth/Throat:      Mouth: Mucous membranes are dry  Pharynx: No oropharyngeal exudate or posterior oropharyngeal erythema  Eyes:      Extraocular Movements: Extraocular movements intact  Conjunctiva/sclera: Conjunctivae normal       Pupils: Pupils are equal, round, and reactive to light  Cardiovascular:      Rate and Rhythm: Normal rate and regular rhythm  Pulses: Normal pulses  Heart sounds: Normal heart sounds  No murmur heard  No gallop  Pulmonary:      Effort: Pulmonary effort is normal       Breath sounds: Normal breath sounds  No wheezing, rhonchi or rales  Abdominal:      General: There is no distension  Palpations: Abdomen is soft  There is no mass  Tenderness: There is no abdominal tenderness  There is no guarding or rebound  Musculoskeletal:      Right lower leg: No edema  Left lower leg: No edema  Skin:     Findings: No erythema or rash  Neurological:      General: No focal deficit present  Mental Status: He is alert and oriented to person, place, and time     Psychiatric:         Mood and Affect: Mood normal          Behavior: Behavior normal

## 2022-10-11 NOTE — ASSESSMENT & PLAN NOTE
Much  Stable  Now   He  Says  He  Does  Not  Suffer  With  Panic  Attacks  Any  More    Discussed     Life  Style  modifications

## 2022-11-26 PROBLEM — Z00.00 ROUTINE MEDICAL EXAM: Status: RESOLVED | Noted: 2022-09-27 | Resolved: 2022-11-26

## 2022-12-19 DIAGNOSIS — F41.9 ANXIETY: ICD-10-CM

## 2022-12-23 RX ORDER — ESCITALOPRAM OXALATE 10 MG/1
10 TABLET ORAL
Qty: 30 TABLET | Refills: 0 | Status: SHIPPED | OUTPATIENT
Start: 2022-12-23 | End: 2023-01-22

## 2023-01-05 ENCOUNTER — APPOINTMENT (OUTPATIENT)
Dept: LAB | Facility: HOSPITAL | Age: 58
End: 2023-01-05

## 2023-01-05 DIAGNOSIS — R73.9 HYPERGLYCEMIA: ICD-10-CM

## 2023-01-05 DIAGNOSIS — E78.2 MIXED HYPERLIPIDEMIA: ICD-10-CM

## 2023-01-05 DIAGNOSIS — I10 HYPERTENSION, UNSPECIFIED TYPE: ICD-10-CM

## 2023-01-05 LAB
ALBUMIN SERPL BCP-MCNC: 4.1 G/DL (ref 3.5–5)
ALP SERPL-CCNC: 43 U/L (ref 34–104)
ALT SERPL W P-5'-P-CCNC: 15 U/L (ref 7–52)
ANION GAP SERPL CALCULATED.3IONS-SCNC: 6 MMOL/L (ref 4–13)
AST SERPL W P-5'-P-CCNC: 20 U/L (ref 13–39)
BASOPHILS # BLD AUTO: 0.04 THOUSANDS/ÂΜL (ref 0–0.1)
BASOPHILS NFR BLD AUTO: 1 % (ref 0–1)
BILIRUB SERPL-MCNC: 0.6 MG/DL (ref 0.2–1)
BILIRUB UR QL STRIP: NEGATIVE
BUN SERPL-MCNC: 12 MG/DL (ref 5–25)
CALCIUM SERPL-MCNC: 9.2 MG/DL (ref 8.4–10.2)
CHLORIDE SERPL-SCNC: 102 MMOL/L (ref 96–108)
CHOLEST SERPL-MCNC: 186 MG/DL
CLARITY UR: CLEAR
CO2 SERPL-SCNC: 31 MMOL/L (ref 21–32)
COLOR UR: YELLOW
CREAT SERPL-MCNC: 0.68 MG/DL (ref 0.6–1.3)
EOSINOPHIL # BLD AUTO: 0.09 THOUSAND/ÂΜL (ref 0–0.61)
EOSINOPHIL NFR BLD AUTO: 2 % (ref 0–6)
ERYTHROCYTE [DISTWIDTH] IN BLOOD BY AUTOMATED COUNT: 14 % (ref 11.6–15.1)
GFR SERPL CREATININE-BSD FRML MDRD: 107 ML/MIN/1.73SQ M
GLUCOSE P FAST SERPL-MCNC: 102 MG/DL (ref 65–99)
GLUCOSE UR STRIP-MCNC: NEGATIVE MG/DL
HCT VFR BLD AUTO: 42.2 % (ref 36.5–49.3)
HDLC SERPL-MCNC: 114 MG/DL
HGB BLD-MCNC: 13.8 G/DL (ref 12–17)
HGB UR QL STRIP.AUTO: NEGATIVE
IMM GRANULOCYTES # BLD AUTO: 0.01 THOUSAND/UL (ref 0–0.2)
IMM GRANULOCYTES NFR BLD AUTO: 0 % (ref 0–2)
KETONES UR STRIP-MCNC: NEGATIVE MG/DL
LDLC SERPL CALC-MCNC: 64 MG/DL (ref 0–100)
LEUKOCYTE ESTERASE UR QL STRIP: NEGATIVE
LYMPHOCYTES # BLD AUTO: 1.3 THOUSANDS/ÂΜL (ref 0.6–4.47)
LYMPHOCYTES NFR BLD AUTO: 32 % (ref 14–44)
MCH RBC QN AUTO: 32.5 PG (ref 26.8–34.3)
MCHC RBC AUTO-ENTMCNC: 32.7 G/DL (ref 31.4–37.4)
MCV RBC AUTO: 99 FL (ref 82–98)
MONOCYTES # BLD AUTO: 0.43 THOUSAND/ÂΜL (ref 0.17–1.22)
MONOCYTES NFR BLD AUTO: 11 % (ref 4–12)
NEUTROPHILS # BLD AUTO: 2.19 THOUSANDS/ÂΜL (ref 1.85–7.62)
NEUTS SEG NFR BLD AUTO: 54 % (ref 43–75)
NITRITE UR QL STRIP: NEGATIVE
NONHDLC SERPL-MCNC: 72 MG/DL
NRBC BLD AUTO-RTO: 0 /100 WBCS
PH UR STRIP.AUTO: 7 [PH]
PLATELET # BLD AUTO: 220 THOUSANDS/UL (ref 149–390)
PMV BLD AUTO: 9.7 FL (ref 8.9–12.7)
POTASSIUM SERPL-SCNC: 4.7 MMOL/L (ref 3.5–5.3)
PROT SERPL-MCNC: 6.5 G/DL (ref 6.4–8.4)
PROT UR STRIP-MCNC: NEGATIVE MG/DL
RBC # BLD AUTO: 4.25 MILLION/UL (ref 3.88–5.62)
SODIUM SERPL-SCNC: 139 MMOL/L (ref 135–147)
SP GR UR STRIP.AUTO: 1.02 (ref 1–1.03)
TRIGL SERPL-MCNC: 40 MG/DL
UROBILINOGEN UR QL STRIP.AUTO: 0.2 E.U./DL
WBC # BLD AUTO: 4.06 THOUSAND/UL (ref 4.31–10.16)

## 2023-01-08 DIAGNOSIS — I10 ESSENTIAL HYPERTENSION: ICD-10-CM

## 2023-01-08 DIAGNOSIS — I10 HYPERTENSION, UNSPECIFIED TYPE: ICD-10-CM

## 2023-01-08 LAB
EST. AVERAGE GLUCOSE BLD GHB EST-MCNC: 94 MG/DL
HBA1C MFR BLD: 4.9 %

## 2023-01-08 NOTE — TELEPHONE ENCOUNTER
Medication Refill Request     Name lisinopril (ZESTRIL) 5 mg tablet  Dose/Frequency Take 1 tablet (5 mg total) by mouth daily  Quantity 90  Verified pharmacy   [x]  Verified ordering Provider   [x]  Does patient have enough for the next 3 days? Yes [x] No []    Medication Refill Request     Name amLODIPine (NORVASC) 5 mg tablet  Dose/Frequency Take 1 tablet (5 mg total) by mouth daily  Quantity 90  Verified pharmacy   [x]  Verified ordering Provider   [x]  Does patient have enough for the next 3 days?  Yes [x] No []

## 2023-01-09 RX ORDER — LISINOPRIL 5 MG/1
5 TABLET ORAL DAILY
Qty: 90 TABLET | Refills: 3 | Status: SHIPPED | OUTPATIENT
Start: 2023-01-09 | End: 2023-04-09

## 2023-01-09 RX ORDER — AMLODIPINE BESYLATE 5 MG/1
5 TABLET ORAL DAILY
Qty: 90 TABLET | Refills: 3 | Status: SHIPPED | OUTPATIENT
Start: 2023-01-09 | End: 2023-04-09

## 2023-01-18 ENCOUNTER — RA CDI HCC (OUTPATIENT)
Dept: OTHER | Facility: HOSPITAL | Age: 58
End: 2023-01-18

## 2023-01-18 NOTE — PROGRESS NOTES
[facility]       Chart reviewed, no opportunity found: CHART REVIEWED, NO OPPORTUNITY FOUND        Patients Insurance        Commercial Insurance: [address] [place] [address]

## 2023-01-24 ENCOUNTER — OFFICE VISIT (OUTPATIENT)
Dept: FAMILY MEDICINE CLINIC | Facility: CLINIC | Age: 58
End: 2023-01-24

## 2023-01-24 VITALS
TEMPERATURE: 97.1 F | DIASTOLIC BLOOD PRESSURE: 90 MMHG | RESPIRATION RATE: 18 BRPM | HEIGHT: 69 IN | HEART RATE: 60 BPM | WEIGHT: 140.2 LBS | OXYGEN SATURATION: 98 % | SYSTOLIC BLOOD PRESSURE: 134 MMHG | BODY MASS INDEX: 20.76 KG/M2

## 2023-01-24 DIAGNOSIS — F41.9 ANXIETY: ICD-10-CM

## 2023-01-24 DIAGNOSIS — E78.2 MIXED HYPERLIPIDEMIA: ICD-10-CM

## 2023-01-24 DIAGNOSIS — Z12.11 SCREENING FOR COLON CANCER: ICD-10-CM

## 2023-01-24 DIAGNOSIS — F41.0 PANIC ATTACKS: ICD-10-CM

## 2023-01-24 DIAGNOSIS — I10 PRIMARY HYPERTENSION: Primary | ICD-10-CM

## 2023-01-24 DIAGNOSIS — R73.9 HYPERGLYCEMIA: ICD-10-CM

## 2023-01-24 RX ORDER — ESCITALOPRAM OXALATE 10 MG/1
10 TABLET ORAL
Qty: 30 TABLET | Refills: 0 | Status: SHIPPED | OUTPATIENT
Start: 2023-01-24 | End: 2023-02-23

## 2023-01-24 NOTE — PROGRESS NOTES
Assessment/Plan:  As  below       Problem List Items Addressed This Visit        Other    Anxiety    Relevant Medications    escitalopram (LEXAPRO) 10 mg tablet         Subjective:      Patient ID: Jaron Grandchild is a 56 y o  male  HPI-  Came  In  To  Follow up  On  His  htn hyperlipidemia  Anxiety  And hyperglycemia    His  Labs  Reviewed  He  Is  Doing  Much  Better  Feels  good    The following portions of the patient's history were reviewed and updated as appropriate:   Past Medical History:  He has a past medical history of Hypertension  ,  _______________________________________________________________________  Medical Problems:  does not have any pertinent problems on file ,  _______________________________________________________________________  Past Surgical History:   has no past surgical history on file ,  _______________________________________________________________________  Family History:  family history includes Heart disease in his brother and father ,  _______________________________________________________________________  Social History:   reports that he has been smoking  He has been smoking an average of 1 pack per day  He has never used smokeless tobacco  He reports current alcohol use of about 30 0 standard drinks per week  He reports that he does not use drugs  ,  _______________________________________________________________________  Allergies:  has No Known Allergies     _______________________________________________________________________  Current Outpatient Medications   Medication Sig Dispense Refill   • amLODIPine (NORVASC) 5 mg tablet Take 1 tablet (5 mg total) by mouth daily 90 tablet 3   • escitalopram (LEXAPRO) 10 mg tablet Take 1 tablet (10 mg total) by mouth daily after dinner 30 tablet 0   • lisinopril (ZESTRIL) 5 mg tablet Take 1 tablet (5 mg total) by mouth daily 90 tablet 3   • lisinopril (ZESTRIL) 5 mg tablet TAKE 1 TABLET BY MOUTH EVERY DAY (Patient not taking: No sig reported) 90 tablet 0     No current facility-administered medications for this visit      _______________________________________________________________________  Review of Systems   Constitutional: Negative for fatigue and fever  HENT: Negative for congestion and postnasal drip  Eyes: Negative for pain, discharge and itching  Respiratory: Negative for cough and shortness of breath  Cardiovascular: Negative for chest pain, palpitations and leg swelling  Htn hyperlipidemia   Gastrointestinal: Negative for abdominal distention and abdominal pain  Endocrine: Negative for cold intolerance, heat intolerance and polydipsia  Hyperglycemia   Genitourinary: Negative for dysuria and flank pain  Musculoskeletal: Negative for arthralgias and back pain  Skin: Negative for rash  Neurological: Negative for dizziness and headaches  Psychiatric/Behavioral: Negative for self-injury, sleep disturbance and suicidal ideas  The patient is nervous/anxious  Objective:  Vitals:    01/24/23 1732   BP: 134/90   BP Location: Left arm   Patient Position: Sitting   Cuff Size: Adult   Pulse: 60   Resp: 18   Temp: (!) 97 1 °F (36 2 °C)   TempSrc: Temporal   SpO2: 98%   Weight: 63 6 kg (140 lb 3 2 oz)   Height: 5' 9" (1 753 m)     Body mass index is 20 7 kg/m²  Physical Exam  Vitals and nursing note reviewed  Constitutional:       General: He is not in acute distress  Appearance: Normal appearance  He is not ill-appearing, toxic-appearing or diaphoretic  HENT:      Head: Normocephalic and atraumatic  Nose: Nose normal  No congestion  Mouth/Throat:      Mouth: Mucous membranes are moist       Pharynx: No posterior oropharyngeal erythema  Neck:      Vascular: No carotid bruit  Cardiovascular:      Rate and Rhythm: Normal rate and regular rhythm  Pulses: Normal pulses  Heart sounds: Normal heart sounds  No murmur heard  No gallop     Pulmonary:      Effort: Pulmonary effort is normal       Breath sounds: Normal breath sounds  No wheezing, rhonchi or rales  Abdominal:      Palpations: Abdomen is soft  There is no mass  Tenderness: There is no abdominal tenderness  There is no guarding  Musculoskeletal:      Cervical back: Normal range of motion and neck supple  No rigidity or tenderness  Right lower leg: No edema  Left lower leg: No edema  Lymphadenopathy:      Cervical: No cervical adenopathy  Skin:     Findings: No erythema or rash  Neurological:      General: No focal deficit present  Mental Status: He is alert and oriented to person, place, and time     Psychiatric:         Mood and Affect: Mood normal          Behavior: Behavior normal

## 2023-01-24 NOTE — ASSESSMENT & PLAN NOTE
His  Last  Labs  Reviewed    And  Discussed    Discussed  Diet  Exercise Opioid Pregnancy And Lactation Text: These medications can lead to premature delivery and should be avoided during pregnancy. These medications are also present in breast milk in small amounts.

## 2023-01-24 NOTE — ASSESSMENT & PLAN NOTE
His  BP  Is  Much  Stable  Now   Repeat  BP  Is  126/80     He  Is  Taking amlodipine 5 mg  And  losartain  5  Mg  Daily  Keep logging

## 2023-02-01 ENCOUNTER — OFFICE VISIT (OUTPATIENT)
Dept: URGENT CARE | Facility: CLINIC | Age: 58
End: 2023-02-01

## 2023-02-01 VITALS
BODY MASS INDEX: 20.73 KG/M2 | OXYGEN SATURATION: 98 % | HEART RATE: 65 BPM | WEIGHT: 140 LBS | TEMPERATURE: 97.9 F | RESPIRATION RATE: 16 BRPM | HEIGHT: 69 IN

## 2023-02-01 DIAGNOSIS — S39.92XA TAILBONE INJURY, INITIAL ENCOUNTER: Primary | ICD-10-CM

## 2023-02-01 DIAGNOSIS — T14.90XA INJURY: ICD-10-CM

## 2023-02-01 NOTE — LETTER
February 1, 2023     Patient: Raymundo Xiao   YOB: 1965   Date of Visit: 2/1/2023       To Whom it May Concern:    Loa Brittle was seen in my clinic on 2/1/2023  Please excuse from work this week due to injury  If you have any questions or concerns, please don't hesitate to call           Sincerely,          MARY Wilkerson        CC: No Recipients

## 2023-02-01 NOTE — PATIENT INSTRUCTIONS
--Initial read of x-ray negative for fracture  Will call with final radiology results when obtained (anticipate 1-12 hours)  --Rest, avoid prolonged sitting/pressure on area  --Donut cushion as needed  --Motrin as needed  Alternative is OTC Voltaren gel  --Follow-up with [facility] if no improvement/worsening over the next 1 week and/or ongoing symptoms beyond 4 weeks

## 2023-02-01 NOTE — PROGRESS NOTES
[facility]        NAME: Humphrey Masters is a 56 y o  male  : 1965    MRN: [id number]  DATE: 2023  TIME: 2:10 PM    Assessment and Plan   Tailbone injury, initial encounter [S39 [id number]]  1  Tailbone injury, initial encounter  Ambulatory referral to Orthopedic Surgery      2  Injury  XR sacrum and coccyx            Patient Instructions     --Initial read of x-ray negative for fracture  Will call with final radiology results when obtained (anticipate 1-12 hours)  --Rest, avoid prolonged sitting/pressure on area  --Donut cushion as needed  --Motrin as needed  Alternative is OTC Voltaren gel  --Follow-up with [facility] spine if no improvement/worsening over the next 1 week and/or ongoing symptoms beyond 4 weeks  Chief Complaint     Chief Complaint   Patient presents with   • Tailbone Pain     Gisel Clayton to left side when he tripped in the driveway on Saturday    still having pain          History of Present Illness       Here with wife for complaints of tailbone pain as the result of injury 4 days ago  Was outdoors  Accidentally lost balance on driveway, falling onto left side and then rolled onto back  Mild initial pain in tailbone area which got worse as the day went on  Has remainder bothersome since  Midline with no radiation bilaterally, to buttocks, down legs, or elsewhere  Rates 8/10 at present  Motrin and heat helping some  Denies significant increase in pain with turning, walking, sitting  No associated N/T/W, bowel or bladder changes, saddle anesthesia  No saddle anesthesia  Possible one prior injury to tailbone/lower back a couple years ago  Doesn't recall details  Smoker  Review of Systems   Review of Systems   Musculoskeletal: Positive for back pain  Negative for gait problem  Neurological: Negative for weakness and numbness           Current Medications       Current Outpatient Medications:   •  amLODIPine (NORVASC) 5 mg tablet, Take 1 tablet (5 mg total) by mouth daily, Disp: 90 tablet, Rfl: 3  •  escitalopram (LEXAPRO) 10 mg tablet, Take 1 tablet (10 mg total) by mouth daily after dinner, Disp: 30 tablet, Rfl: 0  •  lisinopril (ZESTRIL) 5 mg tablet, Take 1 tablet (5 mg total) by mouth daily, Disp: 90 tablet, Rfl: 3  •  lisinopril (ZESTRIL) 5 mg tablet, TAKE 1 TABLET BY MOUTH EVERY DAY (Patient not taking: No sig reported), Disp: 90 tablet, Rfl: 0    Current Allergies     Allergies as of 02/01/2023   • (No Known Allergies)            The following portions of the patient's history were reviewed and updated as appropriate: allergies, current medications, past family history, past medical history, past social history, past surgical history and problem list      Past Medical History:   Diagnosis Date   • Hypertension        No past surgical history on file  Family History   Problem Relation Age of Onset   • Heart disease Father    • Heart disease Brother          Medications have been verified  Objective   Pulse 65   Temp 97 9 °F (36 6 °C)   Resp 16   Ht 5' 9" (1 753 m)   Wt 63 5 kg (140 lb)   SpO2 98%   BMI 20 67 kg/m²   No LMP for male patient  Physical Exam     Physical Exam  Pulmonary:      Effort: Pulmonary effort is normal    Musculoskeletal:         General: Tenderness present  No swelling or deformity  Normal range of motion  Comments: Localized axial tenderness noted over 3 cm portion of lower sacral spine with no visualized or palpable abnormalities including bruising, step-offs, swelling  Remainder of lower back, sacral region nontender with normal appearance  Normal gait  Skin:     Findings: No bruising  Neurological:      Mental Status: He is alert and oriented to person, place, and time  Motor: No weakness        Gait: Gait normal    Psychiatric:         Mood and Affect: Mood normal

## 2023-02-06 ENCOUNTER — APPOINTMENT (EMERGENCY)
Dept: CT IMAGING | Facility: HOSPITAL | Age: 58
End: 2023-02-06

## 2023-02-06 ENCOUNTER — HOSPITAL ENCOUNTER (EMERGENCY)
Facility: HOSPITAL | Age: 58
Discharge: HOME | End: 2023-02-06
Attending: EMERGENCY MEDICINE

## 2023-02-06 ENCOUNTER — TELEPHONE (OUTPATIENT)
Dept: FAMILY MEDICINE CLINIC | Facility: CLINIC | Age: 58
End: 2023-02-06

## 2023-02-06 VITALS
WEIGHT: 140 LBS | SYSTOLIC BLOOD PRESSURE: 131 MMHG | TEMPERATURE: 97.5 F | HEART RATE: 67 BPM | HEIGHT: 69 IN | RESPIRATION RATE: 18 BRPM | DIASTOLIC BLOOD PRESSURE: 81 MMHG | OXYGEN SATURATION: 100 % | BODY MASS INDEX: 20.73 KG/M2

## 2023-02-06 DIAGNOSIS — R20.2 PARESTHESIAS: ICD-10-CM

## 2023-02-06 DIAGNOSIS — M54.9 BACK PAIN: ICD-10-CM

## 2023-02-06 DIAGNOSIS — M53.3 COCCYX PAIN: Primary | ICD-10-CM

## 2023-02-06 RX ORDER — LIDOCAINE 50 MG/G
1 PATCH TOPICAL ONCE
Status: DISCONTINUED | OUTPATIENT
Start: 2023-02-06 | End: 2023-02-06 | Stop reason: HOSPADM

## 2023-02-06 RX ORDER — NAPROXEN 500 MG/1
500 TABLET ORAL 2 TIMES DAILY WITH MEALS
Qty: 30 TABLET | Refills: 0 | Status: SHIPPED | OUTPATIENT
Start: 2023-02-06

## 2023-02-06 RX ORDER — ACETAMINOPHEN 500 MG
500 TABLET ORAL EVERY 6 HOURS PRN
Qty: 40 TABLET | Refills: 0 | Status: SHIPPED | OUTPATIENT
Start: 2023-02-06

## 2023-02-06 RX ORDER — METHOCARBAMOL 500 MG/1
500 TABLET, FILM COATED ORAL 3 TIMES DAILY PRN
Qty: 20 TABLET | Refills: 0 | Status: SHIPPED | OUTPATIENT
Start: 2023-02-06

## 2023-02-06 RX ORDER — NAPROXEN 250 MG/1
250 TABLET ORAL ONCE
Status: COMPLETED | OUTPATIENT
Start: 2023-02-06 | End: 2023-02-06

## 2023-02-06 RX ORDER — ACETAMINOPHEN 325 MG/1
975 TABLET ORAL ONCE
Status: COMPLETED | OUTPATIENT
Start: 2023-02-06 | End: 2023-02-06

## 2023-02-06 RX ADMIN — LIDOCAINE 5% 1 PATCH: 700 PATCH TOPICAL at 12:15

## 2023-02-06 RX ADMIN — NAPROXEN 250 MG: 250 TABLET ORAL at 12:15

## 2023-02-06 RX ADMIN — ACETAMINOPHEN 975 MG: 325 TABLET, FILM COATED ORAL at 12:15

## 2023-02-06 NOTE — DISCHARGE INSTRUCTIONS
CT scan today showed a small abnormality in the last segment of your coccyx  This is your tailbone  It is possible that you have a very small fracture here, though the imaging did not definitively show this  Treat your pain with Tylenol, naproxen  Use Robaxin as needed for muscle spasms  This medication will make you sleepy  Do not take it and drive  Follow-up with comprehensive spine as soon as possible  Back to the emergency department for new worsening symptoms as we spoke about previously  The symptoms include but are not limited to numbness between your legs, urinary or bowel incontinence, urinary retention, fevers, weakness, numbness

## 2023-02-06 NOTE — ED PROVIDER NOTES
History  Chief Complaint   Patient presents with   • Back Pain     Mechanical fall 2 weeks ago, seen last week, pain worsening last night, reports numbness radiating to B/L legs last night while showering, since subsided, but pain continues, no meds PTA     45-year-old male history of hypertension, knee surgery presents for back pain  Patient states that he fell on January 28  Patient was carrying wood, tripped and fell onto his side of his back  Was seen in urgent care  Diagnosed with sacral pain after an x-ray showed no fracture  Told to sit on a doughnut pillow  NSAIDs and Tylenol as needed for pain  Patient is taking nothing for the pain  Reports increasing symptoms  Reports numbness in his buttocks yesterday briefly while he was showering  Reports paresthesias in his legs earlier today  Has no deficits at this point  Denies cancer history, weight loss, IV drug usage history, fevers, urinary incontinence, urinary retention, bowel incontinence, inability to have a bowel movement  Back Pain  Location:  Lumbar spine  Quality:  Aching  Radiates to:  Does not radiate  Pain severity:  Severe  Onset quality:  Gradual  Timing:  Intermittent  Progression:  Worsening  Chronicity:  New  Context: falling    Relieved by:  Nothing  Worsened by:  Nothing  Ineffective treatments:  None tried  Associated symptoms: numbness        Prior to Admission Medications   Prescriptions Last Dose Informant Patient Reported? Taking?    amLODIPine (NORVASC) 5 mg tablet 2/6/2023  No Yes   Sig: Take 1 tablet (5 mg total) by mouth daily   escitalopram (LEXAPRO) 10 mg tablet 2/5/2023  No Yes   Sig: Take 1 tablet (10 mg total) by mouth daily after dinner   lisinopril (ZESTRIL) 5 mg tablet   No No   Sig: TAKE 1 TABLET BY MOUTH EVERY DAY   Patient not taking: No sig reported   lisinopril (ZESTRIL) 5 mg tablet 2/6/2023  No Yes   Sig: Take 1 tablet (5 mg total) by mouth daily      Facility-Administered Medications: None       Past Medical History:   Diagnosis Date   • Hypertension        Past Surgical History:   Procedure Laterality Date   • KNEE SURGERY         Family History   Problem Relation Age of Onset   • Heart disease Father    • Heart disease Brother      I have reviewed and agree with the history as documented  E-Cigarette/Vaping   • E-Cigarette Use Never User      E-Cigarette/Vaping Substances     Social History     Tobacco Use   • Smoking status: Every Day     Packs/day: 2 00     Types: Cigarettes   • Smokeless tobacco: Never   Vaping Use   • Vaping Use: Never used   Substance Use Topics   • Alcohol use: Yes     Alcohol/week: 30 0 standard drinks     Types: 30 Cans of beer per week     Comment: daily   • Drug use: Never       Review of Systems   Musculoskeletal: Positive for back pain  Neurological: Positive for numbness  Paresthesias   All other systems reviewed and are negative  Physical Exam  Physical Exam  Vitals and nursing note reviewed  Constitutional:       General: He is not in acute distress  Appearance: He is well-developed  He is not diaphoretic  HENT:      Head: Normocephalic and atraumatic  Right Ear: External ear normal       Left Ear: External ear normal    Eyes:      Conjunctiva/sclera: Conjunctivae normal    Neck:      Trachea: No tracheal deviation  Cardiovascular:      Rate and Rhythm: Normal rate and regular rhythm  Heart sounds: Normal heart sounds  No murmur heard  Pulmonary:      Effort: No respiratory distress  Breath sounds: Normal breath sounds  No stridor  No wheezing or rales  Abdominal:      General: Bowel sounds are normal  There is no distension  Palpations: Abdomen is soft  There is no mass  Tenderness: There is no abdominal tenderness  There is no guarding or rebound  Musculoskeletal:         General: Tenderness present  No deformity  Comments: Tenderness to palpation of the L5 region of the central spine    No pain elsewhere on palpation of the central spine  No sacral tenderness at the tailbone   Skin:     General: Skin is dry  Findings: No rash  Neurological:      General: No focal deficit present  Mental Status: Mental status is at baseline  Motor: No abnormal muscle tone  Coordination: Coordination normal       Comments: Muscular changes 5 out of 5 in the lower extremities bilaterally  Patient has normal patellar reflex  Has normal sensation to light touch throughout the lower extremities including within the medial thighs  Psychiatric:         Behavior: Behavior normal          Thought Content: Thought content normal          Judgment: Judgment normal          Vital Signs  ED Triage Vitals [02/06/23 1150]   Temperature Pulse Respirations Blood Pressure SpO2   97 5 °F (36 4 °C) 67 18 131/81 100 %      Temp Source Heart Rate Source Patient Position - Orthostatic VS BP Location FiO2 (%)   Oral Monitor Sitting Right arm --      Pain Score       8           Vitals:    02/06/23 1150   BP: 131/81   Pulse: 67   Patient Position - Orthostatic VS: Sitting         Visual Acuity      ED Medications  Medications   lidocaine (LIDODERM) 5 % patch 1 patch (1 patch Topical Medication Applied 2/6/23 1215)   acetaminophen (TYLENOL) tablet 975 mg (975 mg Oral Given 2/6/23 1215)   naproxen (NAPROSYN) tablet 250 mg (250 mg Oral Given 2/6/23 1215)       Diagnostic Studies  Results Reviewed     None                 CT spine lumbar without contrast   Final Result by Cleo Ovalle MD (02/06 1311)      No acute osseous abnormality of lumbar spine  Multilevel degenerative changes of lumbar spine with transitional lumbosacral anatomy (lumbarized S1 vertebral body), varying degrees of canal stenosis (multilevel mild) and foraminal narrowing (mild bilateral L5-S1), as detailed above  Please see same-day CT pelvis without contrast for further evaluation           Workstation performed: [id number]         CT pelvis wo contrast   Final Result by Bailey Garcia MD (02/06 0447)      Linear lucency seen in the terminal coccygeal segment, image 101 series 304 may be artifactual due to motion: Nondisplaced fracture difficult to exclude, correlate clinically   Nonemergent evaluation with MRI can be helpful if needed   The study was marked in EPIC for significant notification  Workstation performed: [id number]                    Procedures  Procedures         ED Course                                             Medical Decision Making  Patient presents with lower lumbar back pain as well as sacral pain after fall  Will evaluate for fracture, injury  Brief episode of numbness yesterday  Unsure the etiology  No red flags  Will refer to the comprehensive spine clinic for possible MRI in the future  Strict return precautions given for red flags which I went over with the patient  CT scan with possible coccyx fracture which I informed patient  Patient has multiple areas of degenerative changes in the lumbar spine  Patient referred to comprehensive spine  Return precautions given  Back pain: acute illness or injury  Coccyx pain: acute illness or injury  Paresthesias: acute illness or injury  Amount and/or Complexity of Data Reviewed  Radiology: ordered  Risk  OTC drugs  Prescription drug management  Disposition  Final diagnoses:   Coccyx pain   Back pain   Paresthesias     Time reflects when diagnosis was documented in both MDM as applicable and the Disposition within this note     Time User Action Codes Description Comment    2/6/2023  1:46 PM Chester Means Add [M53 3] Coccyx pain     2/6/2023  1:46 PM Chester Means Add [M54 9] Back pain     2/6/2023  1:46 PM Chester Means Add [R20 2] Paresthesias       ED Disposition     ED Disposition   Discharge    Condition   Stable    Date/Time   Mon Feb 6, 2023  1:46 PM    Comment   Jaron Grandchild discharge to home/self care                 Follow-up Information     Follow up With Specialties Details Why Contact Info Additional [address]  Emergency Department Emergency Medicine  If symptoms worsen [address] [address]  [address] Emergency Department, [address] [place], [place] [address]    Esther Walsh MD Family Medicine  For re-evaluation as soon as possible [address] [place] [address] Physical Therapy Schedule an appointment as soon as possible for a visit  For re-evaluation as soon as possible [phone]          Discharge Medication List as of 2/6/2023  1:49 PM      START taking these medications    Details   acetaminophen (TYLENOL) 500 mg tablet Take 1 tablet (500 mg total) by mouth every 6 (six) hours as needed for mild pain, Starting Mon 2/6/2023, Normal      methocarbamol (ROBAXIN) 500 mg tablet Take 1 tablet (500 mg total) by mouth 3 (three) times a day as needed for muscle spasms, Starting Mon 2/6/2023, Normal      naproxen (Naprosyn) 500 mg tablet Take 1 tablet (500 mg total) by mouth 2 (two) times a day with meals, Starting Mon 2/6/2023, Normal         CONTINUE these medications which have NOT CHANGED    Details   amLODIPine (NORVASC) 5 mg tablet Take 1 tablet (5 mg total) by mouth daily, Starting Mon 1/9/2023, Until Sun 4/9/2023, Normal      escitalopram (LEXAPRO) 10 mg tablet Take 1 tablet (10 mg total) by mouth daily after dinner, Starting Tue 1/24/2023, Until Thu 2/23/2023, Normal      !! lisinopril (ZESTRIL) 5 mg tablet Take 1 tablet (5 mg total) by mouth daily, Starting Mon 1/9/2023, Until Sun 4/9/2023, Normal      !! lisinopril (ZESTRIL) 5 mg tablet TAKE 1 TABLET BY MOUTH EVERY DAY, Normal       !! - Potential duplicate medications found  Please discuss with provider                PDMP Review     None          ED Provider  Electronically Signed by           Eliza Guadarrama [address] [facility], DO  02/06/23 [address], DO  02/06/23 1557

## 2023-02-06 NOTE — TELEPHONE ENCOUNTER
Pt's wife called and stated that he is in so much pain from his fall that he stated to her to call 911 which she did and they took him to [address] [place] [address] , she stated that she just wanted to keep you in the loop of things happening with him

## 2023-02-06 NOTE — Clinical Note
Janetgabcaprice Dong was seen and treated in our emergency department on 2/6/2023  Diagnosis: Possible coccyx fracture    Sha  may return to work on return date  He may return on this date: 02/08/2023         If you have any questions or concerns, please don't hesitate to call        Batsheva Robles DO    ______________________________           _______________          _______________  Hospital Representative                              Date                                Time

## 2023-02-07 ENCOUNTER — TELEPHONE (OUTPATIENT)
Dept: PHYSICAL THERAPY | Facility: OTHER | Age: 58
End: 2023-02-07

## 2023-02-07 NOTE — TELEPHONE ENCOUNTER
Call placed to the patient per [facility] referral     Patient handed phone to his wife Stella North  She is on communication consent  I explained [facility] to them, and also let them know they could schedule with Ortho   (Had a ref to Ortho, Pt declined to schedule at that time)    Patient's wife states they will f/u with the PCP    [facility] closed

## 2023-02-08 ENCOUNTER — TELEPHONE (OUTPATIENT)
Dept: PHYSICAL THERAPY | Facility: OTHER | Age: 58
End: 2023-02-08

## 2023-02-08 NOTE — TELEPHONE ENCOUNTER
Caller: spouse    Doctor: nelida    Reason for call: Devinmisbah Wilkinsone attempting to reach [facility]  As we were talking she said [facility] was calling her   D/C call    Call back#: na

## 2023-02-08 NOTE — TELEPHONE ENCOUNTER
Returned patient's (wife's) call she placed to [facility] on 2/8/23 @ 9AM     Voice message left for patient to call back  Phone number and hours of business provided       Will help patient when they call back

## 2023-02-08 NOTE — TELEPHONE ENCOUNTER
Patient's wife called back requesting an appointment in the office for eval  I suggested she start with Ortho  Had a referral from 2/1/23 to see Ortho, at the time did not want to schedule       Given phone number to Ortho and transferred

## 2023-02-08 NOTE — TELEPHONE ENCOUNTER
Returned patient's (wife's) call she placed to [facility] on 2/7/23 @7:52PM      Voice message left for patient to call back  Phone number and hours of business provided       Referral was already closed since patient declined triage for PT, and per chart notes declined scheduling with Ortho from that referral placed 2/1/23

## 2023-02-09 ENCOUNTER — TELEPHONE (OUTPATIENT)
Dept: OBGYN CLINIC | Facility: HOSPITAL | Age: 58
End: 2023-02-09

## 2023-02-09 ENCOUNTER — OFFICE VISIT (OUTPATIENT)
Dept: OBGYN CLINIC | Facility: MEDICAL CENTER | Age: 58
End: 2023-02-09

## 2023-02-09 VITALS
BODY MASS INDEX: 21.77 KG/M2 | WEIGHT: 147 LBS | HEART RATE: 66 BPM | DIASTOLIC BLOOD PRESSURE: 75 MMHG | SYSTOLIC BLOOD PRESSURE: 143 MMHG | HEIGHT: 69 IN

## 2023-02-09 DIAGNOSIS — M53.3 TRAUMATIC COCCYDYNIA: Primary | ICD-10-CM

## 2023-02-09 NOTE — LETTER
To Whom It May Concern,    Humphrey Masters is under my professional care  He was seen in my office on February 9, 2023  Please excuse the patient for any work messed from 1/28/2023 to 2/12  Patient can return to work on 2/13/2023 without restrictions  If you have any questions or concerns, please do not hesitate to call          Sincerely,          Hazel Martinez, DO

## 2023-02-09 NOTE — PROGRESS NOTES
1  Traumatic coccydynia          No orders of the defined types were placed in this encounter  Impression:  Coccyx pain that is multifactorial and predominantly due to coccydynia  There are no concerning neurological deficits  We discussed different treatment options and decided to wait continue with NSAID and muscle relaxant  His symptoms should only improve from here on out  We discussed increasing hydration and fiber/stool softener intake to prevent constipation which can aggravate his symptoms  He can try donut hole or a coccyx cushion to help with symptoms  He can also try wearing biking shorts with padding to help when he returns to work  He was provided with a work note  I will see him back if needed  Return if symptoms worsen or fail to improve  Patient is in agreement with the above plan  Imaging Studies (I personally reviewed images in PACS and report if it was available):  CT of lumbar spine and pelvis reviewed  Agree with official read as below  "No acute osseous abnormality of lumbar spine      Multilevel degenerative changes of lumbar spine with transitional lumbosacral anatomy (lumbarized S1 vertebral body), varying degrees of canal stenosis (multilevel mild) and foraminal narrowing (mild bilateral L5-S1), as detailed above "  Tiny lucency seen in the last coccygeal fragment which likely represents a nondisplaced fracture in the setting of trauma and tenderness in this region  The patient's pertinent emergency department/urgent care/referring physician's notes were reviewed  HPI:  Humphrey Masters is a 56 y o  male  who presents for evaluation of   Chief Complaint   Patient presents with   • Tailbone Pain       Onset/Mechanism: 1/28/2023-patient had a fall in late January onto his tailbone  Location: Tailbone area  Radiation: Paraspinal region, SIJ  Quality: Painful  Had tingling but this has improved  Provocative: Hard surfaces  Severity: 6/10    Associated Symptoms: Tingling which has resolved  Treatment so far: Tylenol, muscle relaxant and naproxen  No red flag symptoms including fever/chills, unintentional weight change, bowel/bladder incontinence, scissoring gait, personal/family history of cancer, pain worse at night, intravenous drug abuse or trauma  Following history reviewed and updated:  Past Medical History:   Diagnosis Date   • Hypertension      Past Surgical History:   Procedure Laterality Date   • KNEE SURGERY       Social History   Social History     Substance and Sexual Activity   Alcohol Use Yes   • Alcohol/week: 30 0 standard drinks   • Types: 30 Cans of beer per week    Comment: daily     Social History     Substance and Sexual Activity   Drug Use Never     Social History     Tobacco Use   Smoking Status Every Day   • Packs/day: 2 00   • Types: Cigarettes   Smokeless Tobacco Never     Family History   Problem Relation Age of Onset   • Heart disease Father    • Heart disease Brother      No Known Allergies     Constitutional:  /75   Pulse 66   Ht 5' 9" (1 753 m)   Wt 66 7 kg (147 lb)   BMI 21 71 kg/m²    General: NAD  Eyes: Anicteric sclerae  Neck: Supple  Lungs: Unlabored breathing  Cardiovascular: No lower extremity edema  Skin: Intact without erythema  Neurologic: Sensation intact to light touch  Psychiatric: Mood and affect are appropriate  Orthopedic Examination  • Inspection: No obvious deformities, lesions or rashes  • ROM: Within normal limits  • Palpation: TTP along the coccyx and bilateral SIJs  There are no step offs  • Neuro: Bilateral extremity strength is normal and symmetric  No muscle atrophy or abnormal tone  Bilateral extremity muscle stretch reflexes are physiologic and symmetric   No myelopathic signs  Sensation to light touch is intact throughout  Neural compression testing: Normal bilateral SLR/dural stretch  Normal Borja's maneuver      Gait is normal     Procedures

## 2023-02-09 NOTE — LETTER
To Whom It May Concern,     Stevemark Xiao is under my professional care    He was seen in my office on February 9, 2023        Patient can return to work on 2/13/2023 for half days only      If you have any questions or concerns, please do not hesitate to call            Sincerely,            Hazel Hendricks, DO

## 2023-02-09 NOTE — TELEPHONE ENCOUNTER
Caller: Patient spouse    Doctor: Ryder Patel    Reason for call:  Patients spouse called stating the letter they received at today's appointment did not have the date the patient is due back at work  Upon looking at chart, I read the letter to patient spouse and she states that's not the letter they were given  Caller states they drove back to the office but it was closed so she slipped the letter underneath the door       Please advise     Call back#: [phone]

## 2023-02-10 ENCOUNTER — TELEPHONE (OUTPATIENT)
Dept: OBGYN CLINIC | Facility: MEDICAL CENTER | Age: 58
End: 2023-02-10

## 2023-02-10 NOTE — TELEPHONE ENCOUNTER
Patient wife came into office to  the  completed disability paper work, she stated that she didn't know the forms would be faxed and that she wanted to pick them up because other documents needed to be sent over to them as well  I was able to help her compile the documents and send the whole file as one

## 2023-02-13 ENCOUNTER — TELEPHONE (OUTPATIENT)
Dept: OBGYN CLINIC | Facility: HOSPITAL | Age: 58
End: 2023-02-13

## 2023-02-13 NOTE — TELEPHONE ENCOUNTER
Caller: wife     Doctor: Skylar Appiah    Reason for call: wife called question #15 question on the disability form  The pt was told he has a fx not just pain    She would like a call back    Call back#: [phone]

## 2023-02-20 DIAGNOSIS — F41.9 ANXIETY: ICD-10-CM

## 2023-02-21 NOTE — TELEPHONE ENCOUNTER
Medication Refill Request     Name Lexapro  Dose/Frequency Take 1 tablet (10 mg total) by mouth daily after dinner  Quantity 30 tablet  Verified pharmacy   [x]  Verified ordering Provider   [x]  Does patient have enough for the next 3 days?  Yes [x] No []

## 2023-02-22 RX ORDER — ESCITALOPRAM OXALATE 10 MG/1
10 TABLET ORAL
Qty: 30 TABLET | Refills: 0 | Status: SHIPPED | OUTPATIENT
Start: 2023-02-22 | End: 2023-03-24

## 2023-03-10 ENCOUNTER — TELEPHONE (OUTPATIENT)
Dept: PSYCHIATRY | Facility: CLINIC | Age: 58
End: 2023-03-10

## 2023-03-19 ENCOUNTER — TELEPHONE (OUTPATIENT)
Dept: OTHER | Facility: OTHER | Age: 58
End: 2023-03-19

## 2023-03-19 NOTE — TELEPHONE ENCOUNTER
Patient mom called in, patient requires a letter to return to work  The letter requires to say that his wife is in the hospital, but he is clear to return to work  Please mail the letter to the address on file

## 2023-03-21 NOTE — TELEPHONE ENCOUNTER
Called patient in regards for further information for his letter for work and went straight to voicemail  Left a detailed message for a call back

## 2023-03-30 ENCOUNTER — TELEPHONE (OUTPATIENT)
Dept: OBGYN CLINIC | Facility: CLINIC | Age: 58
End: 2023-03-30

## 2023-03-30 NOTE — TELEPHONE ENCOUNTER
Caller: Jennifer Bell     Doctor/Office: Dr Martinez     CB#: n/a       What needs to be faxed: Letter from 2/9/23     ATTN to: Esha Callahan     Fax#: [phone]      Documents were successfully e-faxed

## 2023-04-03 DIAGNOSIS — F41.9 ANXIETY: ICD-10-CM

## 2023-04-03 RX ORDER — ESCITALOPRAM OXALATE 10 MG/1
TABLET ORAL
Qty: 30 TABLET | Refills: 0 | Status: SHIPPED | OUTPATIENT
Start: 2023-04-03

## 2023-04-24 ENCOUNTER — NURSE TRIAGE (OUTPATIENT)
Dept: OTHER | Facility: OTHER | Age: 58
End: 2023-04-24

## 2023-04-25 ENCOUNTER — TELEPHONE (OUTPATIENT)
Dept: FAMILY MEDICINE CLINIC | Facility: CLINIC | Age: 58
End: 2023-04-25

## 2023-04-25 DIAGNOSIS — G47.00 INSOMNIA, UNSPECIFIED TYPE: ICD-10-CM

## 2023-04-25 DIAGNOSIS — F32.A DEPRESSION, UNSPECIFIED DEPRESSION TYPE: Primary | ICD-10-CM

## 2023-04-25 NOTE — TELEPHONE ENCOUNTER
"Regarding: anxiety  ----- Message from Cash Calhoun sent at 4/24/2023  8:53 PM EDT -----  \"My  lost his job and he can not sleep at night he is suffering from anxiety escitalopram (LEXAPRO) 10 mg tablet  ## The message may be incomplete  It was sent as a result of a timeout   ##    "

## 2023-04-25 NOTE — TELEPHONE ENCOUNTER
Hi, I'm calling for Bringme  This is Shandra Holbrook, his wife  His YOB: 1965  I wanted to know if you got the message from last night  Please give me a call back at your convenience  I would like to know what the doctor had said  If she wants to see him , she would change the medication or what she wants to do  But Gladis Sagastume does need a little help  So whenever you have when sorry, whenever you have time, like I said at your convenience, give me a call back at [phone]  And my YOB: 1962  Take care   Bymary

## 2023-04-25 NOTE — TELEPHONE ENCOUNTER
"  Reason for Disposition  • Insomnia is an ongoing problem (> 2 weeks)    Answer Assessment - Initial Assessment Questions  1  DESCRIPTION: \"Tell me about your sleeping problem  \"       Unable to sleep at night  2  ONSET: \"How long have you been having trouble sleeping? \" (e g , days, weeks, months)      About 1 month ago  4  STRESS: \"Is there anything in your life that is making you feel stressed or tense? \"      Recently lost his job  5  PAIN: \"Do you have any pain that is keeping you awake? \" (e g , back pain, headache, abdominal pain)      denies  8  OTHER SYMPTOMS: \"Do you have any other symptoms? \"  (e g , difficulty breathing)      denies    Protocols used: INSOMNIA-ADULT-    "

## 2023-04-25 NOTE — PROGRESS NOTES
Psych  Referral he  Should   Practice sleep  hygiene  And  Take  Melatonin  otc  5 mg  q  Hs  An  Hour  Before  Sleep

## 2023-04-25 NOTE — TELEPHONE ENCOUNTER
"Patient recently lost his job and since has been having a very difficult time sleeping  Lexapro does not seem to be helping  Would like to discuss options  Patient denies any desire to hurt himself or others  Per spouse has just been \"really bummed\"  Please advise    "

## 2023-04-26 NOTE — TELEPHONE ENCOUNTER
Called pt and appt is already scheduled for tomorrow at [address] [place] [address] to discuss anxiety/dep over recent job loss

## 2023-04-28 ENCOUNTER — TELEPHONE (OUTPATIENT)
Dept: PSYCHIATRY | Facility: CLINIC | Age: 58
End: 2023-04-28

## 2023-05-03 NOTE — TELEPHONE ENCOUNTER
Spoke to pt regarding routine referral  Pt states that he is no longer interested in services  Referral closed

## 2023-05-26 ENCOUNTER — TELEPHONE (OUTPATIENT)
Dept: FAMILY MEDICINE CLINIC | Facility: CLINIC | Age: 58
End: 2023-05-26

## 2023-05-26 DIAGNOSIS — F32.A DEPRESSION, UNSPECIFIED DEPRESSION TYPE: Primary | ICD-10-CM

## 2023-05-26 RX ORDER — ESCITALOPRAM OXALATE 20 MG/1
20 TABLET ORAL DAILY
Qty: 30 TABLET | Refills: 2 | Status: SHIPPED | OUTPATIENT
Start: 2023-05-26 | End: 2023-06-25

## 2023-05-26 NOTE — TELEPHONE ENCOUNTER
Pt wife Kelvin Parikh called in stating that pt lost his job and is in a lot of distress  Pt wife and pt wondering if he can take 2 of the medication listed below instead of just 1   If this is ok with you, pt is also requesting a refill      escitalopram (LEXAPRO) 10 mg tablet

## 2023-05-30 NOTE — TELEPHONE ENCOUNTER
Left voicemail for pt regarding Dr Darwin Pedersen previous message, with any questions or concerns, give our office a call

## 2023-08-28 ENCOUNTER — PATIENT OUTREACH (OUTPATIENT)
Dept: FAMILY MEDICINE CLINIC | Facility: CLINIC | Age: 58
End: 2023-08-28

## 2023-08-28 DIAGNOSIS — Z78.9 NEED FOR FOLLOW-UP BY SOCIAL WORKER: ICD-10-CM

## 2023-08-28 DIAGNOSIS — Z59.89 DOES NOT HAVE HEALTH INSURANCE: Primary | ICD-10-CM

## 2023-08-28 SDOH — ECONOMIC STABILITY - INCOME SECURITY: OTHER PROBLEMS RELATED TO HOUSING AND ECONOMIC CIRCUMSTANCES: Z59.89

## 2023-08-28 NOTE — PROGRESS NOTES
OP SWCM received Teams message from [facility] and discussed case.   CMOC completed home visit with both patient and his wife Sundeep Bowling will assist them with applying for MA- referral in chart and OP SWCM has been added to care team.

## 2023-08-29 ENCOUNTER — PATIENT OUTREACH (OUTPATIENT)
Dept: FAMILY MEDICINE CLINIC | Facility: CLINIC | Age: 58
End: 2023-08-29

## 2023-08-29 NOTE — PROGRESS NOTES
[facility] received patient referral from [address] [place] [address]. Patient is experiencing financial difficulties and is in need of health insurance. [facility] has already meet with patient in person yesterday 8/28/23 during a home visit with his wife Norwood Cabot. [facility] has started a [employer] application for both SNAP and Medicaid for the patient. [facility] is following up with patient next week 9/5/23 in person for additional verification documents.

## 2023-09-05 ENCOUNTER — PATIENT OUTREACH (OUTPATIENT)
Dept: FAMILY MEDICINE CLINIC | Facility: CLINIC | Age: 58
End: 2023-09-05

## 2023-09-05 NOTE — PROGRESS NOTES
[facility] completed a home visit with both patient and his wife. [employer] picked up bank statements needed for SNAP and MA application. Patient will still need to provide an update letter from [employer] with the current face cash value for the RIZZO. Patient has called [employer] and his annual statement will be put in the mail on 9/22/23. [facility] will outreach patient as needed.

## 2023-09-14 ENCOUNTER — PATIENT OUTREACH (OUTPATIENT)
Dept: FAMILY MEDICINE CLINIC | Facility: CLINIC | Age: 58
End: 2023-09-14

## 2023-09-14 NOTE — PROGRESS NOTES
Received in basket from [facility]. Home visit completed on 09/05 with patient and his wife. SNAP and MA application in progress and will update CMOC when he receives annual statement in mail. OP SWCM will continue to follow.

## 2023-09-18 ENCOUNTER — PATIENT OUTREACH (OUTPATIENT)
Dept: FAMILY MEDICINE CLINIC | Facility: CLINIC | Age: 58
End: 2023-09-18

## 2023-09-18 NOTE — PROGRESS NOTES
[facility] call patient for scheduled outreach. Patient , along with his wife have been approved for [place] Health [place] and [place] benefits as per the [employer]. No additional information was available. CMOC called patient and spoke to his wife Cornelio Rodriguez. She has not received any documents in the mail as of today, however they will call [facility] when they do. [facility] will outreach patient on 9/21/23 if no call is received prior to then.

## 2023-09-22 ENCOUNTER — PATIENT OUTREACH (OUTPATIENT)
Dept: FAMILY MEDICINE CLINIC | Facility: CLINIC | Age: 58
End: 2023-09-22

## 2023-09-22 NOTE — PROGRESS NOTES
[facility] completed a home visit with patient and his spouse Eliza Vasquez. While I was at  home the [place] enrollment  called and spoke with myself and Sha. Sha choose [facility] as his health insurance provider. He will receive his insurance cards in the mail in 10-14 days. If patient needs to visit the PCP prior to insurance cards arriving, he was told to provide his ACCESS Card. Patients ACCESS card arrived in the mail today, he has placed it his wallet for safe keeping. .Some additional benefits that are included is [facility] and Dental Benefits. Patient will receive additional information about benefits with his insurance cards. Additional information on today's visit is documented under spouse Conner Chavarria 9/22/23. [facility] has completed the referral goal for this patient. No further outreach is scheduled at this time.

## 2023-09-25 ENCOUNTER — PATIENT OUTREACH (OUTPATIENT)
Dept: FAMILY MEDICINE CLINIC | Facility: CLINIC | Age: 58
End: 2023-09-25

## 2023-09-25 NOTE — PROGRESS NOTES
Received in basket from [facility] who completed home visit on 09/22/2023. Patient has been approved for MA and chose [employer] as his health insurance provider. Patient also received ACCESS card in mail. CMOC and OP SWCM referral closed as all goals have been met. Will continue assisting patients wife Oleg Alexander with additional needs.

## 2023-10-04 ENCOUNTER — OFFICE VISIT (OUTPATIENT)
Dept: FAMILY MEDICINE CLINIC | Facility: CLINIC | Age: 58
End: 2023-10-04
Payer: COMMERCIAL

## 2023-10-04 VITALS
WEIGHT: 148.2 LBS | OXYGEN SATURATION: 97 % | SYSTOLIC BLOOD PRESSURE: 154 MMHG | DIASTOLIC BLOOD PRESSURE: 74 MMHG | HEART RATE: 71 BPM | BODY MASS INDEX: 21.95 KG/M2 | HEIGHT: 69 IN | TEMPERATURE: 98.8 F

## 2023-10-04 DIAGNOSIS — Z12.5 PROSTATE CANCER SCREENING: ICD-10-CM

## 2023-10-04 DIAGNOSIS — I10 PRIMARY HYPERTENSION: Primary | ICD-10-CM

## 2023-10-04 DIAGNOSIS — E78.2 MIXED HYPERLIPIDEMIA: ICD-10-CM

## 2023-10-04 DIAGNOSIS — Z13.89 SCREENING FOR BLOOD OR PROTEIN IN URINE: ICD-10-CM

## 2023-10-04 DIAGNOSIS — Z13.29 THYROID DISORDER SCREEN: ICD-10-CM

## 2023-10-04 DIAGNOSIS — Z76.89 ENCOUNTER TO ESTABLISH CARE: ICD-10-CM

## 2023-10-04 DIAGNOSIS — Z23 FLU VACCINE NEED: ICD-10-CM

## 2023-10-04 DIAGNOSIS — E55.9 VITAMIN D DEFICIENCY: ICD-10-CM

## 2023-10-04 DIAGNOSIS — R73.9 HYPERGLYCEMIA: ICD-10-CM

## 2023-10-04 DIAGNOSIS — F41.9 ANXIETY: ICD-10-CM

## 2023-10-04 DIAGNOSIS — I10 HYPERTENSION, UNSPECIFIED TYPE: ICD-10-CM

## 2023-10-04 PROCEDURE — 90686 IIV4 VACC NO PRSV 0.5 ML IM: CPT | Performed by: FAMILY MEDICINE

## 2023-10-04 PROCEDURE — 90471 IMMUNIZATION ADMIN: CPT | Performed by: FAMILY MEDICINE

## 2023-10-04 PROCEDURE — 99215 OFFICE O/P EST HI 40 MIN: CPT | Performed by: FAMILY MEDICINE

## 2023-10-04 RX ORDER — LISINOPRIL 20 MG/1
20 TABLET ORAL DAILY
Qty: 90 TABLET | Refills: 3 | Status: SHIPPED | OUTPATIENT
Start: 2023-10-04 | End: 2024-01-02

## 2023-10-04 RX ORDER — LISINOPRIL 20 MG/1
20 TABLET ORAL DAILY
Qty: 90 TABLET | Refills: 3 | Status: SHIPPED | OUTPATIENT
Start: 2023-10-04 | End: 2023-10-04 | Stop reason: SDUPTHER

## 2023-10-04 RX ORDER — ALPRAZOLAM 0.25 MG/1
0.25 TABLET ORAL DAILY PRN
Qty: 30 TABLET | Refills: 0 | Status: SHIPPED | OUTPATIENT
Start: 2023-10-04

## 2023-10-04 NOTE — ASSESSMENT & PLAN NOTE
I checked his blood pressure today which revealed 160/84 mmHg and by MA which noted 154/74 mmHg. Increase lisinopril 5 mg to 20 mg once daily, sending 90 tablets with 3 refills. Continue amlodipine 5 mg once daily. Low sodium diet advised. Smoking and alcohol cessation were recommended. Return to office in 1 month for a nurse visit for Geronimo to check blood pressure.

## 2023-10-04 NOTE — ASSESSMENT & PLAN NOTE
Take 1 tablet of Xanax orally as needed for anxiety. We recommend using it very sparingly. I will prescribe 30 tablets of Xanax and send the prescription to Jared at [facility].

## 2023-10-04 NOTE — PROGRESS NOTES
Assessment/Plan:       1. Primary hypertension  Comments:  Blood pressure by me 160/84 and by /74  Increase lisinopril from 5 mg to 20 mg once daily and keep Norvasc 5 mg once daily  Assessment & Plan:  I checked his blood pressure today which revealed 160/84 mmHg and by MA which noted 154/74 mmHg. Increase lisinopril 5 mg to 20 mg once daily, sending 90 tablets with 3 refills. Continue amlodipine 5 mg once daily. Low sodium diet advised. Smoking and alcohol cessation were recommended. Return to office in 1 month for a nurse visit for Geronimo to check blood pressure. Orders:  -     CBC; Future  -     Comprehensive metabolic panel; Future    2. Anxiety  Assessment & Plan:  Take 1 tablet of Xanax orally as needed for anxiety. We recommend using it very sparingly. I will prescribe 30 tablets of Xanax and send the prescription to Jared at [facility]. Orders:  -     ALPRAZolam (XANAX) 0.25 mg tablet; Take 1 tablet (0.25 mg total) by mouth daily as needed for anxiety Use very sparingly    3. Hyperglycemia  -     UA w Reflex to Microscopic w Reflex to Culture  -     Albumin / creatinine urine ratio    4. Mixed hyperlipidemia  -     Lipid panel; Future    5. Prostate cancer screening  -     PSA, Total Screen; Future    6. Screening for blood or protein in urine  -     UA w Reflex to Microscopic w Reflex to Culture    7. Thyroid disorder screen  -     TSH, 3rd generation; Future    8. Vitamin D deficiency  -     Vitamin D 25 hydroxy; Future    9. Encounter to establish care    10. Hypertension, unspecified type  -     lisinopril (ZESTRIL) 20 mg tablet; Take 1 tablet (20 mg total) by mouth daily    11. Flu vaccine need  -     influenza vaccine, quadrivalent, 0.5 mL, preservative-free, for adult and pediatric patients 6 mos+ (AFLURIA, FLUARIX, FLULAVAL, FLUZONE)      Need for vaccine. Influenza vaccine given.     Follow up with me in 3 months and complete the laboratory tests including PSA prior to the appointment. Tobacco Cessation Counseling: Tobacco cessation counseling was provided. The patient is sincerely urged to quit consumption of tobacco. He is not ready to quit tobacco. Medication options discussed. Side effects of medication not discussed. Patient refused medication. Tobacco use screening or tobacco cessation counseling was not performed due to other medical reasons. Subjective:      Patient ID: Silvia Horner is a 56 y.o. male who presents today to [facility] care. He has hypertension, anxiety, and needs an influenza vaccination. He is accompanied by his wife. A nurse practitioner, Alf Zhang, is present during the first part of the encounter to administer the vaccine injections. The patient had his last appointment with Dr. Susanne Landry on 04/2023. He is wondering if he can obtain a prescription for Xanax or any relaxation medication as he vomits occasionally when he gets "worked up." The patient conveys that when he starts coughing, he feels like he is having anxiety. He used to take escitalopram 10 mg which Dr. Susanne Landry increased the dose to 20 mg. His blood pressure is elevated today at 154/74 mmHg. The patient's blood pressure medications include amlodipine and lisinopril. He reports not taking it daily due to lack of insurance coverage and he does not have prescriptions for it. However, he resumed taking amlodipine 5 mg daily last Friday, 09/29/2023. He observes 1 meal per day. He denies lower extremity edema. He was on methocarbamol in the past after he sustained a coccyx fracture. Laboratory test on 01/05/2023 showed A1c 4.9% and blood glucose 102 mg/dL. WBC was 4,000 WBCs per microliter, hemoglobin was 13.8 g/dL, and platelet count was 947,052 platelets per microliter of blood. Total cholesterol 186 mg/dL, triglycerides 40 mg/dL,  mg/dL. PSA level in 09/2022 was 0.6 ng/mL. The patient previously worked as a .  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 He reports smoking approximately 2 packs per day for 35 years. He drinks 2 cases of beer per week. He denies marijuana use. He is . He denies allergies to eggs or gelatin. The patient has never had issues with any vaccine in the past.      The following portions of the patient's history were reviewed and updated as appropriate: allergies, current medications, past family history, past medical history, past social history, past surgical history and problem list.    Review of Systems   Constitutional: Negative for activity change, appetite change, chills, diaphoresis, fatigue, fever and unexpected weight change. HENT: Negative for congestion, dental problem, drooling, ear discharge, ear pain, facial swelling, mouth sores, nosebleeds, postnasal drip, rhinorrhea, trouble swallowing and voice change. Eyes: Negative for photophobia, pain, discharge, redness, itching and visual disturbance. Respiratory: Negative for apnea, cough, choking, chest tightness and shortness of breath. Cardiovascular: Negative for chest pain and leg swelling. Gastrointestinal: Negative for abdominal distention, abdominal pain, constipation, diarrhea and nausea. Endocrine: Negative for polydipsia, polyphagia and polyuria. Genitourinary: Negative for decreased urine volume, difficulty urinating, dysuria, enuresis and hematuria. Musculoskeletal: Negative for arthralgias, back pain, gait problem and joint swelling. Skin: Negative for color change, pallor, rash and wound. Allergic/Immunologic: Negative for immunocompromised state. Neurological: Negative for dizziness, seizures, syncope, facial asymmetry, speech difficulty, light-headedness and headaches. Hematological: Negative for adenopathy. Psychiatric/Behavioral: Negative for agitation, behavioral problems, confusion and decreased concentration.      DAXROS      Objective:  /74 (BP Location: Left arm, Patient Position: Sitting, Cuff Size: Standard)   Pulse 71   Temp 98.8 °F (37.1 °C) (Temporal)   Ht 5' 9"   Wt 67.2 kg (148 lb 3.2 oz)   SpO2 97%   BMI 21.89 kg/m²          Physical Exam  Vitals and nursing note reviewed. Constitutional:       General: He is not in acute distress. Appearance: Normal appearance. He is well-developed. He is not ill-appearing, toxic-appearing or diaphoretic. HENT:      Head: Normocephalic and atraumatic. Right Ear: Tympanic membrane normal.      Left Ear: Tympanic membrane normal.      Nose: Nose normal.      Mouth/Throat:      Mouth: Mucous membranes are moist.   Eyes:      Pupils: Pupils are equal, round, and reactive to light. Neck:      Trachea: No tracheal deviation. Cardiovascular:      Rate and Rhythm: Normal rate and regular rhythm. Pulses: Normal pulses. Heart sounds: Normal heart sounds. Pulmonary:      Effort: Pulmonary effort is normal.      Breath sounds: Normal breath sounds. No wheezing, rhonchi or rales. Abdominal:      General: Abdomen is flat. Palpations: Abdomen is soft. Musculoskeletal:         General: Normal range of motion. Cervical back: Normal range of motion and neck supple. Lymphadenopathy:      Cervical: No cervical adenopathy. Skin:     General: Skin is warm and dry. Comments: He has ecchymosis noted on his arm. Neurological:      General: No focal deficit present. Mental Status: He is alert and oriented to person, place, and time. Mental status is at baseline. Psychiatric:         Mood and Affect: Mood normal.         Behavior: Behavior normal.         Thought Content: Thought content normal.         Judgment: Judgment normal.             Transcribed for Sunil Cordero DO, by Carina Good on 10/04/23 at 4:58 PM. Powered by [employer].  (Reviewed by Luís Teran)

## 2023-10-11 ENCOUNTER — HOSPITAL ENCOUNTER (EMERGENCY)
Facility: HOSPITAL | Age: 58
Discharge: HOME | End: 2023-10-12
Attending: EMERGENCY MEDICINE
Payer: COMMERCIAL

## 2023-10-11 VITALS
BODY MASS INDEX: 21.92 KG/M2 | SYSTOLIC BLOOD PRESSURE: 135 MMHG | WEIGHT: 148 LBS | DIASTOLIC BLOOD PRESSURE: 70 MMHG | RESPIRATION RATE: 18 BRPM | TEMPERATURE: 99.4 F | OXYGEN SATURATION: 96 % | HEART RATE: 88 BPM | HEIGHT: 69 IN

## 2023-10-11 DIAGNOSIS — R11.2 NAUSEA AND VOMITING: Primary | ICD-10-CM

## 2023-10-11 DIAGNOSIS — E87.1 HYPONATREMIA: ICD-10-CM

## 2023-10-11 DIAGNOSIS — Z87.898 HISTORY OF FEVER: ICD-10-CM

## 2023-10-11 DIAGNOSIS — R79.89 ELEVATED LFTS: ICD-10-CM

## 2023-10-11 PROCEDURE — 99283 EMERGENCY DEPT VISIT LOW MDM: CPT

## 2023-10-11 PROCEDURE — 87040 BLOOD CULTURE FOR BACTERIA: CPT | Performed by: EMERGENCY MEDICINE

## 2023-10-11 PROCEDURE — 36415 COLL VENOUS BLD VENIPUNCTURE: CPT | Performed by: EMERGENCY MEDICINE

## 2023-10-11 PROCEDURE — 99284 EMERGENCY DEPT VISIT MOD MDM: CPT | Performed by: EMERGENCY MEDICINE

## 2023-10-11 RX ORDER — ONDANSETRON 2 MG/ML
4 INJECTION INTRAMUSCULAR; INTRAVENOUS ONCE
Status: COMPLETED | OUTPATIENT
Start: 2023-10-11 | End: 2023-10-12

## 2023-10-11 RX ORDER — ACETAMINOPHEN 325 MG/1
650 TABLET ORAL ONCE
Status: COMPLETED | OUTPATIENT
Start: 2023-10-12 | End: 2023-10-12

## 2023-10-11 RX ORDER — KETOROLAC TROMETHAMINE 30 MG/ML
15 INJECTION, SOLUTION INTRAMUSCULAR; INTRAVENOUS ONCE
Status: COMPLETED | OUTPATIENT
Start: 2023-10-12 | End: 2023-10-12

## 2023-10-12 LAB
ALBUMIN SERPL BCP-MCNC: 3.3 G/DL (ref 3.5–5)
ALP SERPL-CCNC: 147 U/L (ref 34–104)
ALT SERPL W P-5'-P-CCNC: 56 U/L (ref 7–52)
ANION GAP SERPL CALCULATED.3IONS-SCNC: 9 MMOL/L
AST SERPL W P-5'-P-CCNC: 76 U/L (ref 13–39)
BASOPHILS # BLD AUTO: 0.03 THOUSANDS/ÂΜL (ref 0–0.1)
BASOPHILS NFR BLD AUTO: 0 % (ref 0–1)
BILIRUB SERPL-MCNC: 0.85 MG/DL (ref 0.2–1)
BUN SERPL-MCNC: 14 MG/DL (ref 5–25)
CALCIUM ALBUM COR SERPL-MCNC: 9.3 MG/DL (ref 8.3–10.1)
CALCIUM SERPL-MCNC: 8.7 MG/DL (ref 8.4–10.2)
CHLORIDE SERPL-SCNC: 90 MMOL/L (ref 96–108)
CO2 SERPL-SCNC: 27 MMOL/L (ref 21–32)
CREAT SERPL-MCNC: 0.6 MG/DL (ref 0.6–1.3)
EOSINOPHIL # BLD AUTO: 0 THOUSAND/ÂΜL (ref 0–0.61)
EOSINOPHIL NFR BLD AUTO: 0 % (ref 0–6)
ERYTHROCYTE [DISTWIDTH] IN BLOOD BY AUTOMATED COUNT: 13 % (ref 11.6–15.1)
FLUAV RNA RESP QL NAA+PROBE: NEGATIVE
FLUBV RNA RESP QL NAA+PROBE: NEGATIVE
GFR SERPL CREATININE-BSD FRML MDRD: 113 ML/MIN/1.73SQ M
GLUCOSE SERPL-MCNC: 118 MG/DL (ref 65–140)
HCT VFR BLD AUTO: 37.1 % (ref 36.5–49.3)
HGB BLD-MCNC: 13.1 G/DL (ref 12–17)
IMM GRANULOCYTES # BLD AUTO: 0.02 THOUSAND/UL (ref 0–0.2)
IMM GRANULOCYTES NFR BLD AUTO: 0 % (ref 0–2)
LACTATE SERPL-SCNC: 1.1 MMOL/L (ref 0.5–2)
LIPASE SERPL-CCNC: 12 U/L (ref 11–82)
LYMPHOCYTES # BLD AUTO: 0.57 THOUSANDS/ÂΜL (ref 0.6–4.47)
LYMPHOCYTES NFR BLD AUTO: 7 % (ref 14–44)
MAGNESIUM SERPL-MCNC: 1.7 MG/DL (ref 1.9–2.7)
MCH RBC QN AUTO: 33.8 PG (ref 26.8–34.3)
MCHC RBC AUTO-ENTMCNC: 35.3 G/DL (ref 31.4–37.4)
MCV RBC AUTO: 96 FL (ref 82–98)
MONOCYTES # BLD AUTO: 0.44 THOUSAND/ÂΜL (ref 0.17–1.22)
MONOCYTES NFR BLD AUTO: 5 % (ref 4–12)
NEUTROPHILS # BLD AUTO: 7.64 THOUSANDS/ÂΜL (ref 1.85–7.62)
NEUTS SEG NFR BLD AUTO: 88 % (ref 43–75)
NRBC BLD AUTO-RTO: 0 /100 WBCS
PLATELET # BLD AUTO: 196 THOUSANDS/UL (ref 149–390)
PMV BLD AUTO: 9.6 FL (ref 8.9–12.7)
POTASSIUM SERPL-SCNC: 4.2 MMOL/L (ref 3.5–5.3)
PROT SERPL-MCNC: 6.8 G/DL (ref 6.4–8.4)
RBC # BLD AUTO: 3.88 MILLION/UL (ref 3.88–5.62)
RSV RNA RESP QL NAA+PROBE: NEGATIVE
SARS-COV-2 RNA RESP QL NAA+PROBE: NEGATIVE
SODIUM SERPL-SCNC: 126 MMOL/L (ref 135–147)
WBC # BLD AUTO: 8.7 THOUSAND/UL (ref 4.31–10.16)

## 2023-10-12 PROCEDURE — 96374 THER/PROPH/DIAG INJ IV PUSH: CPT

## 2023-10-12 PROCEDURE — 83605 ASSAY OF LACTIC ACID: CPT | Performed by: EMERGENCY MEDICINE

## 2023-10-12 PROCEDURE — 85025 COMPLETE CBC W/AUTO DIFF WBC: CPT | Performed by: EMERGENCY MEDICINE

## 2023-10-12 PROCEDURE — 87040 BLOOD CULTURE FOR BACTERIA: CPT | Performed by: EMERGENCY MEDICINE

## 2023-10-12 PROCEDURE — 96375 TX/PRO/DX INJ NEW DRUG ADDON: CPT

## 2023-10-12 PROCEDURE — 83735 ASSAY OF MAGNESIUM: CPT | Performed by: EMERGENCY MEDICINE

## 2023-10-12 PROCEDURE — 0241U HB NFCT DS VIR RESP RNA 4 TRGT: CPT | Performed by: EMERGENCY MEDICINE

## 2023-10-12 PROCEDURE — 80053 COMPREHEN METABOLIC PANEL: CPT | Performed by: EMERGENCY MEDICINE

## 2023-10-12 PROCEDURE — 83690 ASSAY OF LIPASE: CPT | Performed by: EMERGENCY MEDICINE

## 2023-10-12 PROCEDURE — 96361 HYDRATE IV INFUSION ADD-ON: CPT

## 2023-10-12 RX ORDER — ONDANSETRON 4 MG/1
4 TABLET, ORALLY DISINTEGRATING ORAL EVERY 8 HOURS PRN
Qty: 6 TABLET | Refills: 0 | Status: SHIPPED | OUTPATIENT
Start: 2023-10-12 | End: 2023-10-15

## 2023-10-12 RX ADMIN — ACETAMINOPHEN 650 MG: 325 TABLET, FILM COATED ORAL at 00:09

## 2023-10-12 RX ADMIN — SODIUM CHLORIDE 1000 ML: 0.9 INJECTION, SOLUTION INTRAVENOUS at 00:11

## 2023-10-12 RX ADMIN — KETOROLAC TROMETHAMINE 15 MG: 30 INJECTION, SOLUTION INTRAMUSCULAR; INTRAVENOUS at 00:09

## 2023-10-12 RX ADMIN — ONDANSETRON 4 MG: 2 INJECTION INTRAMUSCULAR; INTRAVENOUS at 00:04

## 2023-10-12 NOTE — DISCHARGE INSTRUCTIONS
Sodium was mildly low at 126. Follow up with your primary doctor/outpatient providers, and return to the emergency department for new or worsening symptoms.

## 2023-10-12 NOTE — ED PROVIDER NOTES
History  Chief Complaint   Patient presents with    Vomiting     Symptoms began at 2a shaking and vomiting. Temp. 102.7 at home     Patient is a 80-year-old male seen in the emergency department with concern for shaking chills, nausea, vomiting which began at approximately 2 AM this morning. Patient notes multiple episodes of nausea/vomiting over the past day. Patient notes no new or unusual foods. Patient notes no recent travel or any definite clear known sick contacts with similar symptoms. Patient notes no chest pain, shortness of breath, abdominal pain, diarrhea. Patient states that she took some over-the-counter medication for symptom control at home prior to evaluation in the emergency department. Prior to Admission Medications   Prescriptions Last Dose Informant Patient Reported? Taking? ALPRAZolam (XANAX) 0.25 mg tablet   No No   Sig: Take 1 tablet (0.25 mg total) by mouth daily as needed for anxiety Use very sparingly   acetaminophen (TYLENOL) 500 mg tablet  Self No No   Sig: Take 1 tablet (500 mg total) by mouth every 6 (six) hours as needed for mild pain   amLODIPine (NORVASC) 5 mg tablet  Self No No   Sig: Take 1 tablet (5 mg total) by mouth daily   escitalopram (LEXAPRO) 20 mg tablet  Self No No   Sig: Take 1 tablet (20 mg total) by mouth daily   lisinopril (ZESTRIL) 20 mg tablet   No No   Sig: Take 1 tablet (20 mg total) by mouth daily   methocarbamol (ROBAXIN) 500 mg tablet  Self No No   Sig: Take 1 tablet (500 mg total) by mouth 3 (three) times a day as needed for muscle spasms   Patient not taking: Reported on 10/4/2023      Facility-Administered Medications: None       Past Medical History:   Diagnosis Date    Hypertension        Past Surgical History:   Procedure Laterality Date    KNEE SURGERY         Family History   Problem Relation Age of Onset    Heart disease Father     Heart disease Brother      I have reviewed and agree with the history as documented.     E-Cigarette/Vaping E-Cigarette Use Never User      E-Cigarette/Vaping Substances     Social History     Tobacco Use    Smoking status: Every Day     Packs/day: 2.00     Types: Cigarettes    Smokeless tobacco: Never   Vaping Use    Vaping Use: Never used   Substance Use Topics    Alcohol use: Yes     Alcohol/week: 30.0 standard drinks of alcohol     Types: 30 Cans of beer per week     Comment: daily    Drug use: Never       Review of Systems   Constitutional:  Positive for chills and fever. HENT:  Negative for ear pain and sore throat. Eyes:  Negative for pain and visual disturbance. Respiratory:  Negative for cough and shortness of breath. Cardiovascular:  Negative for chest pain and palpitations. Gastrointestinal:  Positive for nausea and vomiting. Negative for abdominal pain and diarrhea. Genitourinary:  Negative for decreased urine volume and difficulty urinating. Musculoskeletal:  Negative for gait problem and neck stiffness. Skin:  Negative for color change and rash. Neurological:  Positive for headaches. Negative for seizures and syncope. Psychiatric/Behavioral:  Negative for agitation and confusion. All other systems reviewed and are negative. Physical Exam  Physical Exam  Vitals and nursing note reviewed. Constitutional:       General: He is not in acute distress. Appearance: He is well-developed. HENT:      Head: Normocephalic and atraumatic. Right Ear: External ear normal.      Left Ear: External ear normal.      Nose: Nose normal.      Mouth/Throat:      Pharynx: Oropharynx is clear. Eyes:      General: No scleral icterus. Conjunctiva/sclera: Conjunctivae normal.   Cardiovascular:      Rate and Rhythm: Normal rate and regular rhythm. Heart sounds: No murmur heard. Pulmonary:      Effort: Pulmonary effort is normal. No respiratory distress. Breath sounds: Normal breath sounds. Abdominal:      General: There is no distension. Palpations: Abdomen is soft. Tenderness: There is no abdominal tenderness. Musculoskeletal:         General: No deformity or signs of injury. Cervical back: Normal range of motion and neck supple. Skin:     General: Skin is warm and dry. Neurological:      General: No focal deficit present. Mental Status: He is alert. Cranial Nerves: No cranial nerve deficit. Sensory: No sensory deficit. Psychiatric:         Mood and Affect: Mood normal.         Thought Content:  Thought content normal.         Vital Signs  ED Triage Vitals   Temperature Pulse Respirations Blood Pressure SpO2   10/11/23 2313 10/11/23 2313 10/11/23 2313 10/11/23 2313 10/11/23 2313   98.8 °F (37.1 °C) 87 20 144/71 96 %      Temp Source Heart Rate Source Patient Position - Orthostatic VS BP Location FiO2 (%)   10/11/23 2313 10/11/23 2313 10/11/23 2313 10/11/23 2313 --   Oral Monitor Lying Right arm       Pain Score       10/11/23 2317       No Pain           Vitals:    10/11/23 2313 10/11/23 2317   BP: 144/71 135/70   Pulse: 87 88   Patient Position - Orthostatic VS: Lying Lying         Visual Acuity      ED Medications  Medications   sodium chloride 0.9 % bolus 1,000 mL (1,000 mL Intravenous New Bag 10/12/23 0011)   ondansetron (ZOFRAN) injection 4 mg (4 mg Intravenous Given 10/12/23 0004)   ketorolac (TORADOL) injection 15 mg (15 mg Intravenous Given 10/12/23 0009)   acetaminophen (TYLENOL) tablet 650 mg (650 mg Oral Given 10/12/23 0009)       Diagnostic Studies  Results Reviewed       Procedure Component Value Units Date/Time    COVID19, Influenza A/B, RSV PCR, [facility] [[id number]]  (Normal) Collected: 10/12/23 0003    Lab Status: Final result Specimen: Nares from Nose Updated: 10/12/23 0048     SARS-CoV-2 Negative     INFLUENZA A PCR Negative     INFLUENZA B PCR Negative     RSV PCR Negative    Narrative:      FOR PEDIATRIC PATIENTS - copy/paste COVID Guidelines URL to browser: [email or web address]    SARS-CoV-2 assay is a Nucleic Acid Amplification assay intended for the  qualitative detection of nucleic acid from SARS-CoV-2 in nasopharyngeal  swabs. Results are for the presumptive identification of SARS-CoV-2 RNA. Positive results are indicative of infection with SARS-CoV-2, the virus  causing COVID-19, but do not rule out bacterial infection or co-infection  with other viruses. Laboratories within the [facility] and its  territories are required to report all positive results to the appropriate  public health authorities. Negative results do not preclude SARS-CoV-2  infection and should not be used as the sole basis for treatment or other  patient management decisions. Negative results must be combined with  clinical observations, patient history, and epidemiological information. This test has not been FDA cleared or approved. This test has been authorized by FDA under an Emergency Use Authorization  (EUA). This test is only authorized for the duration of time the  declaration that circumstances exist justifying the authorization of the  emergency use of an in vitro diagnostic tests for detection of SARS-CoV-2  virus and/or diagnosis of COVID-19 infection under section 564(b)(1) of  the Act, 21 U. S.C. 252ZRZ-0(J)(3), unless the authorization is terminated  or revoked sooner. The test has been validated but independent review by FDA  and CLIA is pending. Test performed using [employer] GeneXpert: This RT-PCR assay targets N2,  a region unique to SARS-CoV-2. A conserved region in the E-gene was chosen  for pan-Sarbecovirus detection which includes SARS-CoV-2. According to CMS-2020-01-R, this platform meets the definition of high-throughput technology.     Lactic acid, plasma (w/reflex if result > 2.0) [[id number]]  (Normal) Collected: 10/12/23 0003    Lab Status: Final result Specimen: Blood from Arm, Right Updated: 10/12/23 0030     LACTIC ACID 1.1 mmol/L     Narrative:      Result may be elevated if tourniquet was used during collection.     Comprehensive metabolic panel [[id number]]  (Abnormal) Collected: 10/12/23 0003    Lab Status: Final result Specimen: Blood from Arm, Right Updated: 10/12/23 0030     Sodium 126 mmol/L      Potassium 4.2 mmol/L      Chloride 90 mmol/L      CO2 27 mmol/L      ANION GAP 9 mmol/L      BUN 14 mg/dL      Creatinine 0.60 mg/dL      Glucose 118 mg/dL      Calcium 8.7 mg/dL      Corrected Calcium 9.3 mg/dL      AST 76 U/L      ALT 56 U/L      Alkaline Phosphatase 147 U/L      Total Protein 6.8 g/dL      Albumin 3.3 g/dL      Total Bilirubin 0.85 mg/dL      eGFR 113 ml/min/1.73sq m     Narrative:      [facility] guidelines for Chronic Kidney Disease (CKD):     Stage 1 with normal or high GFR (GFR > 90 mL/min/1.73 square meters)    Stage 2 Mild CKD (GFR = 60-89 mL/min/1.73 square meters)    Stage 3A Moderate CKD (GFR = 45-59 mL/min/1.73 square meters)    Stage 3B Moderate CKD (GFR = 30-44 mL/min/1.73 square meters)    Stage 4 Severe CKD (GFR = 15-29 mL/min/1.73 square meters)    Stage 5 End Stage CKD (GFR <15 mL/min/1.73 square meters)  Note: GFR calculation is accurate only with a steady state creatinine    Lipase [[id number]]  (Normal) Collected: 10/12/23 0003    Lab Status: Final result Specimen: Blood from Arm, Right Updated: 10/12/23 0030     Lipase 12 u/L     Magnesium [[id number]]  (Abnormal) Collected: 10/12/23 0003    Lab Status: Final result Specimen: Blood from Arm, Right Updated: 10/12/23 0030     Magnesium 1.7 mg/dL     CBC and differential [[id number]]  (Abnormal) Collected: 10/12/23 0003    Lab Status: Final result Specimen: Blood from Arm, Right Updated: 10/12/23 0011     WBC 8.70 Thousand/uL      RBC 3.88 Million/uL      Hemoglobin 13.1 g/dL      Hematocrit 37.1 %      MCV 96 fL      MCH 33.8 pg      MCHC 35.3 g/dL      RDW 13.0 %      MPV 9.6 fL      Platelets 156 Thousands/uL      nRBC 0 /100 WBCs      Neutrophils Relative 88 %      Immat GRANS % 0 %      Lymphocytes Relative 7 %      Monocytes Relative 5 %      Eosinophils Relative 0 %      Basophils Relative 0 %      Neutrophils Absolute 7.64 Thousands/µL      Immature Grans Absolute 0.02 Thousand/uL      Lymphocytes Absolute 0.57 Thousands/µL      Monocytes Absolute 0.44 Thousand/µL      Eosinophils Absolute 0.00 Thousand/µL      Basophils Absolute 0.03 Thousands/µL     Blood culture #2 [[id number]] Collected: 10/12/23 0003    Lab Status: In process Specimen: Blood from Line, Venous Updated: 10/12/23 0008    Blood culture #1 [[id number]] Collected: 10/11/23 2357    Lab Status: In process Specimen: Blood from Arm, Left Updated: 10/12/23 0003    UA (URINE) with reflex to Scope [[id number]]     Lab Status: No result Specimen: Urine     Urine culture [[id number]]     Lab Status: No result Specimen: Urine, Clean Catch                    No orders to display              Procedures  Procedures         ED Course                                             Medical Decision Making  Patient is a 56-year-old male seen in the emergency department with concern for nausea/vomiting, fever. Patient was treated with medication for symptom control, with good effect. COVID-19/influenza/RSV swab was ordered in the emergency department. Laboratory evaluation remarkable for low sodium of 126, low chloride of 90, elevated AST of 76, elevated ALT of 56, elevated alk phos of 147, low albumin of 3.3, low magnesium of 1.7, white blood cell count of 8.70, 88% neutrophils. Patient appears well-hydrated, with a benign abdominal exam.  Evaluation is not consistent with appendicitis, diverticulitis, cholecystitis, pancreatitis, or bowel obstruction. Evaluation is consistent with likely gastroenteritis, possibly viral in etiology. Plan to have patient follow up with PCP/outpatient providers. Patient stable for discharge home.   Discharge instructions were reviewed with patient. Amount and/or Complexity of Data Reviewed  Labs: ordered. Decision-making details documented in ED Course. Risk  OTC drugs. Prescription drug management. Disposition  Final diagnoses:   Nausea and vomiting   History of fever   Hyponatremia   Elevated LFTs     Time reflects when diagnosis was documented in both MDM as applicable and the Disposition within this note       Time User Action Codes Description Comment    10/11/2023 11:24 PM Cori Macedonian Add [R11.2] Nausea and vomiting     10/11/2023 11:50 PM Cori Macedonian Add [Z87.898] History of fever     10/12/2023 12:52 AM Cori Macedonian Add [E87.1] Hyponatremia     10/12/2023 12:53 AM Cori Macedonian Add [R79.89] Elevated LFTs           ED Disposition       ED Disposition   Discharge    Condition   Stable    Date/Time   Thu Oct 12, 2023 12:52 AM    Comment   Landy Jas discharge to home/self care. Follow-up Information       Follow up With Specialties Details Why Contact Info Additional Information    Ky Sky DO Family Medicine Call in 1 day  [address] [place] [address] [facility]) Gastroenterology Call  As needed [address] [id number] [facility] [place] [address], [place] [place], [address] [phone]            Patient's Medications   Discharge Prescriptions    ONDANSETRON (ZOFRAN ODT) 4 MG DISINTEGRATING TABLET    Take 1 tablet (4 mg total) by mouth every 8 (eight) hours as needed for nausea for up to 3 days       Start Date: 10/12/2023End Date: 10/15/2023       Order Dose: 4 mg       Quantity: 6 tablet    Refills: 0       No discharge procedures on file.     PDMP Review         Value Time User    PDMP Reviewed  Yes 10/4/2023  1:05 PM Ky Sky DO            ED Provider  Electronically Signed by             Madei Crowell MD  10/12/23 5792

## 2023-10-15 LAB
BACTERIA BLD CULT: NORMAL
BACTERIA BLD CULT: NORMAL

## 2023-10-17 LAB
BACTERIA BLD CULT: NORMAL
BACTERIA BLD CULT: NORMAL

## 2023-10-23 DIAGNOSIS — F32.A DEPRESSION, UNSPECIFIED DEPRESSION TYPE: ICD-10-CM

## 2023-10-23 RX ORDER — ESCITALOPRAM OXALATE 20 MG/1
20 TABLET ORAL DAILY
Qty: 30 TABLET | Refills: 2 | Status: SHIPPED | OUTPATIENT
Start: 2023-10-23 | End: 2023-11-22

## 2023-11-24 DIAGNOSIS — F41.9 ANXIETY: ICD-10-CM

## 2023-11-24 RX ORDER — ALPRAZOLAM 0.25 MG/1
0.25 TABLET ORAL DAILY PRN
Qty: 30 TABLET | Refills: 0 | Status: SHIPPED | OUTPATIENT
Start: 2023-11-24

## 2024-01-15 ENCOUNTER — APPOINTMENT (OUTPATIENT)
Dept: LAB | Facility: HOSPITAL | Age: 59
End: 2024-01-15
Payer: COMMERCIAL

## 2024-01-15 DIAGNOSIS — E55.9 VITAMIN D DEFICIENCY: ICD-10-CM

## 2024-01-15 DIAGNOSIS — E78.2 MIXED HYPERLIPIDEMIA: ICD-10-CM

## 2024-01-15 DIAGNOSIS — Z13.29 THYROID DISORDER SCREEN: ICD-10-CM

## 2024-01-15 DIAGNOSIS — I10 PRIMARY HYPERTENSION: ICD-10-CM

## 2024-01-15 DIAGNOSIS — Z12.5 PROSTATE CANCER SCREENING: ICD-10-CM

## 2024-01-15 LAB
25(OH)D3 SERPL-MCNC: 14.8 NG/ML (ref 30–100)
ALBUMIN SERPL BCP-MCNC: 3.5 G/DL (ref 3.5–5)
ALP SERPL-CCNC: 267 U/L (ref 34–104)
ALT SERPL W P-5'-P-CCNC: 114 U/L (ref 7–52)
ANION GAP SERPL CALCULATED.3IONS-SCNC: 9 MMOL/L
AST SERPL W P-5'-P-CCNC: 154 U/L (ref 13–39)
BILIRUB SERPL-MCNC: 1.18 MG/DL (ref 0.2–1)
BILIRUB UR QL STRIP: NEGATIVE
BUN SERPL-MCNC: 12 MG/DL (ref 5–25)
CALCIUM SERPL-MCNC: 9.5 MG/DL (ref 8.4–10.2)
CHLORIDE SERPL-SCNC: 96 MMOL/L (ref 96–108)
CHOLEST SERPL-MCNC: 335 MG/DL
CLARITY UR: CLEAR
CO2 SERPL-SCNC: 29 MMOL/L (ref 21–32)
COLOR UR: YELLOW
CREAT SERPL-MCNC: 0.62 MG/DL (ref 0.6–1.3)
CREAT UR-MCNC: 119.9 MG/DL
ERYTHROCYTE [DISTWIDTH] IN BLOOD BY AUTOMATED COUNT: 16.9 % (ref 11.6–15.1)
GFR SERPL CREATININE-BSD FRML MDRD: 110 ML/MIN/1.73SQ M
GLUCOSE P FAST SERPL-MCNC: 103 MG/DL (ref 65–99)
GLUCOSE UR STRIP-MCNC: NEGATIVE MG/DL
HCT VFR BLD AUTO: 38.5 % (ref 36.5–49.3)
HDLC SERPL-MCNC: 27 MG/DL
HGB BLD-MCNC: 13 G/DL (ref 12–17)
HGB UR QL STRIP.AUTO: NEGATIVE
KETONES UR STRIP-MCNC: NEGATIVE MG/DL
LEUKOCYTE ESTERASE UR QL STRIP: NEGATIVE
MCH RBC QN AUTO: 33 PG (ref 26.8–34.3)
MCHC RBC AUTO-ENTMCNC: 33.8 G/DL (ref 31.4–37.4)
MCV RBC AUTO: 98 FL (ref 82–98)
MICROALBUMIN UR-MCNC: <7 MG/L
MICROALBUMIN/CREAT 24H UR: <6 MG/G CREATININE (ref 0–30)
NITRITE UR QL STRIP: NEGATIVE
NONHDLC SERPL-MCNC: 308 MG/DL
PH UR STRIP.AUTO: 6 [PH]
PLATELET # BLD AUTO: 188 THOUSANDS/UL (ref 149–390)
PMV BLD AUTO: 9.7 FL (ref 8.9–12.7)
POTASSIUM SERPL-SCNC: 4.1 MMOL/L (ref 3.5–5.3)
PROT SERPL-MCNC: 7.8 G/DL (ref 6.4–8.4)
PROT UR STRIP-MCNC: NEGATIVE MG/DL
PSA SERPL-MCNC: 0.37 NG/ML (ref 0–4)
RBC # BLD AUTO: 3.94 MILLION/UL (ref 3.88–5.62)
SODIUM SERPL-SCNC: 134 MMOL/L (ref 135–147)
SP GR UR STRIP.AUTO: 1.02 (ref 1–1.03)
TRIGL SERPL-MCNC: 653 MG/DL
TSH SERPL DL<=0.05 MIU/L-ACNC: 2.8 UIU/ML (ref 0.45–4.5)
UROBILINOGEN UR QL STRIP.AUTO: 0.2 E.U./DL
WBC # BLD AUTO: 4.2 THOUSAND/UL (ref 4.31–10.16)

## 2024-01-15 PROCEDURE — 85027 COMPLETE CBC AUTOMATED: CPT

## 2024-01-15 PROCEDURE — 80053 COMPREHEN METABOLIC PANEL: CPT

## 2024-01-15 PROCEDURE — G0103 PSA SCREENING: HCPCS

## 2024-01-15 PROCEDURE — 36415 COLL VENOUS BLD VENIPUNCTURE: CPT

## 2024-01-15 PROCEDURE — 80061 LIPID PANEL: CPT

## 2024-01-15 PROCEDURE — 84443 ASSAY THYROID STIM HORMONE: CPT

## 2024-01-15 PROCEDURE — 82306 VITAMIN D 25 HYDROXY: CPT

## 2024-01-17 ENCOUNTER — OFFICE VISIT (OUTPATIENT)
Dept: FAMILY MEDICINE CLINIC | Facility: CLINIC | Age: 59
End: 2024-01-17
Payer: COMMERCIAL

## 2024-01-17 VITALS
OXYGEN SATURATION: 99 % | HEIGHT: 69 IN | DIASTOLIC BLOOD PRESSURE: 60 MMHG | WEIGHT: 158.8 LBS | TEMPERATURE: 98.4 F | HEART RATE: 73 BPM | SYSTOLIC BLOOD PRESSURE: 124 MMHG | BODY MASS INDEX: 23.52 KG/M2

## 2024-01-17 DIAGNOSIS — K70.9 ALCOHOLIC LIVER DISEASE (HCC): Primary | ICD-10-CM

## 2024-01-17 DIAGNOSIS — I10 PRIMARY HYPERTENSION: ICD-10-CM

## 2024-01-17 DIAGNOSIS — F41.9 ANXIETY: ICD-10-CM

## 2024-01-17 DIAGNOSIS — Z11.4 SCREENING FOR HIV (HUMAN IMMUNODEFICIENCY VIRUS): ICD-10-CM

## 2024-01-17 DIAGNOSIS — E53.8 VITAMIN B12 DEFICIENCY: ICD-10-CM

## 2024-01-17 DIAGNOSIS — Z79.899 MEDICATION MANAGEMENT: ICD-10-CM

## 2024-01-17 DIAGNOSIS — E78.2 MIXED HYPERLIPIDEMIA: ICD-10-CM

## 2024-01-17 DIAGNOSIS — Z71.2 ENCOUNTER TO DISCUSS TEST RESULTS: ICD-10-CM

## 2024-01-17 DIAGNOSIS — Z79.899 ENCOUNTER FOR LONG-TERM (CURRENT) USE OF MEDICATIONS: ICD-10-CM

## 2024-01-17 DIAGNOSIS — E55.9 VITAMIN D DEFICIENCY: ICD-10-CM

## 2024-01-17 DIAGNOSIS — R23.3 PETECHIAE OR ECCHYMOSES: ICD-10-CM

## 2024-01-17 DIAGNOSIS — R69 MULTIPLE MEDICAL PROBLEMS: ICD-10-CM

## 2024-01-17 DIAGNOSIS — R73.9 HYPERGLYCEMIA: ICD-10-CM

## 2024-01-17 DIAGNOSIS — Z11.59 NEED FOR HEPATITIS C SCREENING TEST: ICD-10-CM

## 2024-01-17 DIAGNOSIS — F32.A DEPRESSION, UNSPECIFIED DEPRESSION TYPE: ICD-10-CM

## 2024-01-17 PROCEDURE — 99215 OFFICE O/P EST HI 40 MIN: CPT | Performed by: FAMILY MEDICINE

## 2024-01-17 NOTE — PROGRESS NOTES
Assessment/Plan:           1. Alcoholic liver disease (HCC)  Comments:  Patient consumes in the morning amount of beer--could be 2 cases per week  Orders:  -     Protime-INR; Standing  -     Iron; Future  -     Ferritin; Future  -     TIBC Panel (incl. Iron, TIBC, % Iron Saturation); Future  -     Vitamin B12; Future; Expected date: 01/18/2024  -     Gamma GT; Future; Expected date: 01/24/2024  -     Protime-INR  -     US elastography/UGAP; Future; Expected date: 01/24/2024  -     Chronic Hepatitis Panel; Future; Expected date: 01/24/2024    2. Vitamin B12 deficiency  Comments:  Given his degree of alcoholism this is a distinct probability  Orders:  -     Vitamin B12; Future; Expected date: 01/18/2024    3. Need for hepatitis C screening test  -     Hepatitis C Antibody; Future    4. Multiple medical problems  Comments:  Multiple issues in play given his extraordinary amount of beer--see elevated LFTs and alk phos    5. Petechiae or ecchymoses  Comments:  Patient noted and it was obvious that he has multiple petechiae and ecchymoses over forearms both upper extremities secondary to alcoholic liver disease    6. Medication management  Comments:  All medications reviewed and discussed at length the complications of alcoholism on his total health    7. Anxiety    8. Depression, unspecified depression type  Comments:  Is cleared patient is depressed    9. Primary hypertension  Assessment & Plan:  Blood pressure controlled at 124/60 mmHg, taking Norvasc 5 mg once daily and lisinopril 20 mg once daily.      10. Hyperglycemia    11. Vitamin D deficiency  Comments:  Given his degree of alcoholism I would be surprised if vitamin D deficiency is not present    12. Screening for HIV (human immunodeficiency virus)  -     HIV 1/2 AG/AB w Reflex [facility] for 2 yr old and above; Future    13. Encounter to discuss test results    14. Encounter for long-term (current) use of medications    15. Mixed hyperlipidemia  Assessment &  "Plan:  Both triglycerides and cholesterol are high. His triglycerides are high due to his alcohol intake.          Depression.  Comments: He is doing well on Lexapro 20 mg once a day. He can always take an extra Xanax if he gets off the wall.    Encounter to discuss test results.  All laboratory tests were reviewed and discussed, especially LFTs 1 year ago. AST and ALT are in the 20s, now in the 250 range.  This is a matter of great concern and ask been in inordinate amount of time expressing my concern and chagrin over rising LFTs given his degree of alcoholism.  I did this in an extremely careful way as to not be accusatory or in any way demeaning of him but rather repeatedly used the expression, \"you are the best patient that I will see today because I can do the most to help you--if you are willing to help yourself\".  I tried to build his confidence and make him feel there is great hope and turning his situation around and he does not have to develop fulminant liver failure with ascites etc.  We talked about the complications of alcoholism.  His wife was present throughout and many questions answered.  This is a critical time in his life    Immunizations.  He will bring his immunization records.    Cirrhosis of the liver.  He was given information on cirrhosis of liver.    The patient will follow up in 2 months.                  Subjective:       Patient ID: Sha Haddad is a 58 y.o. male who presents for followup.    He received his influenza vaccine in 10/2023.    The following portions of the patient's history were reviewed and updated as appropriate: allergies, current medications, past family history, past medical history, past social history, past surgical history, and problem list.      Review of Systems  Constitutional: Negative for activity change, appetite change, chills, diaphoresis, fatigue, fever and unexpected weight change.  HENT: Negative for congestion, dental problem, drooling, ear " "discharge, ear pain, facial swelling, mouth sores, nosebleeds, postnasal drip, rhinorrhea, trouble swallowing and voice change.    Eyes: Negative for photophobia, pain, discharge, redness, itching and visual disturbance.  Respiratory: Negative for apnea, cough, choking, chest tightness and shortness of breath.    Cardiovascular: Negative for chest pain and leg swelling.  Gastrointestinal: Negative for abdominal distention, abdominal pain, constipation, diarrhea and nausea.  Endocrine: Negative for polydipsia, polyphagia and polyuria.  Genitourinary: Negative for decreased urine volume, difficulty urinating, dysuria, enuresis and hematuria.  Musculoskeletal: Negative for arthralgias, back pain, gait problem and joint swelling.  Skin: Positive for petechiae in bilateral forearms and lower extremities.  Allergic/Immunologic: Negative for immunocompromised state.  Neurological: Negative for dizziness, seizures, syncope, facial asymmetry, speech difficulty, light-headedness and headaches.  Hematological: Negative for adenopathy.  Psychiatric/Behavioral: Negative for agitation, behavioral problems, confusion and decreased concentration.         Objective:       /60 (BP Location: Left arm, Patient Position: Sitting, Cuff Size: Standard)   Pulse 73   Temp 98.4 °F (36.9 °C) (Temporal)   Ht 5' 9\" (1.753 m)   Wt 72 kg (158 lb 12.8 oz)   SpO2 99%   BMI 23.45 kg/m²          Physical Exam  Vitals and nursing note reviewed.   Constitutional:       General: He is not in acute distress.     Appearance: Normal appearance. He is well-developed.   HENT:      Head: Normocephalic and atraumatic.   Eyes:      General:         Right eye: No discharge.         Left eye: No discharge.   Neck:      Thyroid: No thyromegaly.   Cardiovascular:      Rate and Rhythm: Normal rate and regular rhythm.      Pulses: Normal pulses.      Heart sounds: Normal heart sounds. No murmur heard.  Pulmonary:      Effort: Pulmonary effort is normal. "      Breath sounds: Normal breath sounds. No wheezing or rhonchi.   Musculoskeletal:      Cervical back: Neck supple.      Right lower leg: No edema.      Left lower leg: No edema.   Lymphadenopathy:      Cervical: No cervical adenopathy.   Skin:     General: Skin is warm.      Capillary Refill: Capillary refill takes less than 2 seconds.   Neurological:      General: No focal deficit present.      Mental Status: He is alert and oriented to person, place, and time.   Psychiatric:         Mood and Affect: Mood normal.         Behavior: Behavior normal.         Thought Content: Thought content normal.        I personally reviewed the recent (and prior)  lab results, the image studies, pathology, other specialty/physicians consult notes and recommendations, and outside medical records from other institutions, as appropriate. I had a lengthy discussion with the patient and shared the work-up findings. We discussed the diagnosis and management plan.  I spent  40  minutes reviewing the records (labs, clinician notes, outside records, medical history, ordering medicine/tests/procedures, interpreting the imaging/labs previously done) and coordination of care as well as direct time with the patient today, of which greater than 50% of the time was spent in counseling and coordination of care with the patient/family.    Transcribed for KIRK Bear DO, by Neli Garza on 01/21/24 at 5:58 PM. Powered by [employer].

## 2024-01-18 NOTE — ASSESSMENT & PLAN NOTE
Both triglycerides and cholesterol are high. His triglycerides are high due to his alcohol intake.

## 2024-01-18 NOTE — ASSESSMENT & PLAN NOTE
Blood pressure controlled at 124/60 mmHg, taking Norvasc 5 mg once daily and lisinopril 20 mg once daily.

## 2024-01-24 ENCOUNTER — TELEPHONE (OUTPATIENT)
Age: 59
End: 2024-01-24

## 2024-01-24 ENCOUNTER — APPOINTMENT (OUTPATIENT)
Dept: LAB | Facility: HOSPITAL | Age: 59
End: 2024-01-24
Payer: COMMERCIAL

## 2024-01-24 ENCOUNTER — HOSPITAL ENCOUNTER (OUTPATIENT)
Dept: ULTRASOUND IMAGING | Facility: HOSPITAL | Age: 59
Discharge: HOME | End: 2024-01-24
Payer: COMMERCIAL

## 2024-01-24 DIAGNOSIS — K70.9 ALCOHOLIC LIVER DISEASE (HCC): ICD-10-CM

## 2024-01-24 DIAGNOSIS — Z11.59 NEED FOR HEPATITIS C SCREENING TEST: ICD-10-CM

## 2024-01-24 DIAGNOSIS — Z11.4 SCREENING FOR HIV (HUMAN IMMUNODEFICIENCY VIRUS): ICD-10-CM

## 2024-01-24 DIAGNOSIS — E53.8 VITAMIN B12 DEFICIENCY: ICD-10-CM

## 2024-01-24 LAB
FERRITIN SERPL-MCNC: 805 NG/ML (ref 24–336)
GGT SERPL-CCNC: 974 U/L (ref 9–64)
HBV CORE AB SER QL: NORMAL
HBV CORE IGM SER QL: NORMAL
HBV SURFACE AG SER QL: NORMAL
HCV AB SER QL: NORMAL
HIV 1+2 AB+HIV1 P24 AG SERPL QL IA: NORMAL
HIV 2 AB SERPL QL IA: NORMAL
HIV1 AB SERPL QL IA: NORMAL
HIV1 P24 AG SERPL QL IA: NORMAL
INR PPP: 1.18 (ref 0.84–1.19)
IRON SATN MFR SERPL: 48 % (ref 15–50)
IRON SERPL-MCNC: 164 UG/DL (ref 50–212)
PROTHROMBIN TIME: 14.9 SECONDS (ref 11.6–14.5)
TIBC SERPL-MCNC: 340 UG/DL (ref 250–450)
UIBC SERPL-MCNC: 176 UG/DL (ref 155–355)
VIT B12 SERPL-MCNC: 351 PG/ML (ref 180–914)

## 2024-01-24 PROCEDURE — 83540 ASSAY OF IRON: CPT

## 2024-01-24 PROCEDURE — 86705 HEP B CORE ANTIBODY IGM: CPT

## 2024-01-24 PROCEDURE — 82977 ASSAY OF GGT: CPT

## 2024-01-24 PROCEDURE — 36415 COLL VENOUS BLD VENIPUNCTURE: CPT | Performed by: FAMILY MEDICINE

## 2024-01-24 PROCEDURE — 86704 HEP B CORE ANTIBODY TOTAL: CPT

## 2024-01-24 PROCEDURE — 76981 USE PARENCHYMA: CPT

## 2024-01-24 PROCEDURE — 83550 IRON BINDING TEST: CPT

## 2024-01-24 PROCEDURE — 82728 ASSAY OF FERRITIN: CPT

## 2024-01-24 PROCEDURE — 85610 PROTHROMBIN TIME: CPT | Performed by: FAMILY MEDICINE

## 2024-01-24 PROCEDURE — 86803 HEPATITIS C AB TEST: CPT

## 2024-01-24 PROCEDURE — 82607 VITAMIN B-12: CPT

## 2024-01-24 PROCEDURE — 87340 HEPATITIS B SURFACE AG IA: CPT

## 2024-01-24 PROCEDURE — 87389 HIV-1 AG W/HIV-1&-2 AB AG IA: CPT

## 2024-01-24 NOTE — TELEPHONE ENCOUNTER
Pt's wife called to let the doctor know that the pt completed his u/s on liver and blood work. Pt was told to call when completed.

## 2024-01-26 DIAGNOSIS — F32.A DEPRESSION, UNSPECIFIED DEPRESSION TYPE: ICD-10-CM

## 2024-01-26 DIAGNOSIS — F41.9 ANXIETY: ICD-10-CM

## 2024-01-26 NOTE — TELEPHONE ENCOUNTER
Medication Refill Request     Name   ALPRAZolam (XANAX)     Dose/Frequency 0.25 mg tablet / Take 1 tablet (0.25 mg total) by mouth daily as needed for anxiety Use very sparingly   Quantity 30  Verified pharmacy   [x]  Verified ordering Provider   [x]  Does patient have enough for the next 3 days? Yes [x] No []    Medication Refill Request     Name escitalopram (LEXAPRO)    Dose/Frequency 20 mg tablet / Take 1 tablet (20 mg total) by mouth daily   Quantity 30  Verified pharmacy   [x]  Verified ordering Provider   [x]  Does patient have enough for the next 3 days? Yes [x] No []   Awake/Alert

## 2024-01-29 ENCOUNTER — TELEPHONE (OUTPATIENT)
Age: 59
End: 2024-01-29

## 2024-01-29 RX ORDER — ALPRAZOLAM 0.25 MG/1
0.25 TABLET ORAL DAILY PRN
Qty: 30 TABLET | Refills: 0 | Status: SHIPPED | OUTPATIENT
Start: 2024-01-29

## 2024-01-29 RX ORDER — ESCITALOPRAM OXALATE 20 MG/1
20 TABLET ORAL DAILY
Qty: 30 TABLET | Refills: 0 | Status: SHIPPED | OUTPATIENT
Start: 2024-01-29 | End: 2024-02-28

## 2024-01-29 NOTE — TELEPHONE ENCOUNTER
Jazzmine (wife) called to inform liver specialist, Dr Flash Turpin is scheduled for 3/11/2024 that was the soonest appointment

## 2024-02-01 NOTE — TELEPHONE ENCOUNTER
"Received US results of liver -- PCP would like pt to be made aware of fibrosis as predicted at time of OV. Also would like to make sure that pt is no longer excessively drinking/drinking alcohol at all.     Called pt -- no answer so lvm advising call back to discuss.   Called pt home phone and spoke w pt and wife as I was put on speaker after requesting to speak w Pamela himself x 3. Discussed PCP message and that this was caught early which was beneficial, and that this will be further discussed by liver specialist. At which point pt spouse immediately began crying and stated \"If pamela dies I will die with him\" -- I explained that the liver does have regenerative properties as pt spouse had become very emotionally unstable. I stressed importance of not drinking alcohol as well as that this was caught early and can be f/u on by liver specialist. Pt and spouse will call if there are any further questions or concerns.   "

## 2024-02-12 DIAGNOSIS — I10 ESSENTIAL HYPERTENSION: ICD-10-CM

## 2024-02-12 RX ORDER — AMLODIPINE BESYLATE 5 MG/1
5 TABLET ORAL DAILY
Qty: 90 TABLET | Refills: 1 | Status: SHIPPED | OUTPATIENT
Start: 2024-02-12

## 2024-02-21 PROBLEM — Z11.59 NEED FOR HEPATITIS C SCREENING TEST: Status: RESOLVED | Noted: 2024-01-17 | Resolved: 2024-02-21

## 2024-03-11 ENCOUNTER — OFFICE VISIT (OUTPATIENT)
Dept: GASTROENTEROLOGY | Facility: CLINIC | Age: 59
End: 2024-03-11
Payer: COMMERCIAL

## 2024-03-11 VITALS
WEIGHT: 159 LBS | HEIGHT: 69 IN | BODY MASS INDEX: 23.55 KG/M2 | TEMPERATURE: 97.3 F | SYSTOLIC BLOOD PRESSURE: 124 MMHG | DIASTOLIC BLOOD PRESSURE: 72 MMHG

## 2024-03-11 DIAGNOSIS — E53.8 VITAMIN B12 DEFICIENCY: ICD-10-CM

## 2024-03-11 DIAGNOSIS — F32.A DEPRESSION, UNSPECIFIED DEPRESSION TYPE: ICD-10-CM

## 2024-03-11 DIAGNOSIS — K70.9 ALCOHOLIC LIVER DISEASE (HCC): ICD-10-CM

## 2024-03-11 DIAGNOSIS — Z12.11 COLON CANCER SCREENING: Primary | ICD-10-CM

## 2024-03-11 DIAGNOSIS — I10 HYPERTENSION, UNSPECIFIED TYPE: ICD-10-CM

## 2024-03-11 PROCEDURE — 99244 OFF/OP CNSLTJ NEW/EST MOD 40: CPT | Performed by: INTERNAL MEDICINE

## 2024-03-11 RX ORDER — ESCITALOPRAM OXALATE 20 MG/1
20 TABLET ORAL DAILY
Qty: 30 TABLET | Refills: 5 | Status: SHIPPED | OUTPATIENT
Start: 2024-03-11 | End: 2024-09-07

## 2024-03-11 RX ORDER — LISINOPRIL 20 MG/1
20 TABLET ORAL DAILY
Qty: 90 TABLET | Refills: 3 | OUTPATIENT
Start: 2024-03-11 | End: 2024-06-09

## 2024-03-11 NOTE — PROGRESS NOTES
[facility] - Outpatient Consultation  Sha Haddad 58 y.o. male MRN: [id number]  Encounter: [id number]        ASSESSMENT AND PLAN     Diagnoses and all orders for this visit:    Colon cancer screening  -     Cologuard    Alcoholic liver disease (HCC)  Comments:  Patient consumes in the morning amount of beer--could be 2 cases per week  Orders:  -     Ambulatory Referral to Hepatology  -     Hepatitis A antibody, total; Future  -     Hepatitis B surface antibody; Future  -     FLY Screen w/ Reflex to Titer/Pattern; Future  -     Antimitochondrial antibody; Future  -     Anti-smooth muscle antibody, IgG; Future  -     IgG, IgA, IgM; Future  -     Comprehensive metabolic panel; Future  -     Alpha 1 Antitrypsin Phenotype; Future  -     US right upper quadrant; Future    Vitamin B12 deficiency  Comments:  Given his degree of alcoholism this is a distinct probability  Orders:  -     Ambulatory Referral to Hepatology      Discussed natural progression of liver disease to stage of cirrhosis. Discussed importance of complete alcohol cessation moving forward. We also discussed importance of optimization of metabolic risk factors like weight loss, HLD, HTN.     I discussed with Mr. Haddad the natural history of fatty liver disease, including risks for development, and risk for progression to cirrhosis and its complications.  We reviewed that there are no current medications that are FDA approved for the specific management of fatty liver disease.  I explained that there are many, many compounds (both new and medications currently used for other indications) that are currently being studied to treat several aspects of fatty liver disease, including decreasing hepatic fat, hepatic inflammation, hepatic fibrosis, as well reducing risk factors for the development of fatty liver (primarily through weight loss).  We do not currently have active clinical trials through my office, but I will notify   Boo when we do if he meets criteria for inclusion.    Mr. Haddad will have additional blood work done to rule out other causes of chronic liver disease/elevated liver enzymes, including  autoimmune hepatitis and Alpha-1-antitrypsin deficiency    Fibrosis-4 (FIB-4) Score is: 4.3    Indication for testing Absence of advanced fibrosis Presence of advanced fibrosis Indeterminate result   NAFLD <1.30 >2.67 1.30-2.67   Hepatitis C <1.45 >3.25 1.45-3.25   Hepatitis B <1.00 >2.65 1.00-2.65     FIB-4 Score Component Values:  Component Value Date   Age: 58 y.o.     AST: 154 U/L 1/15/2024   Platelet: 188 Thousands/uL 1/15/2024   ALT: 114 U/L 1/15/2024     NAFLD Fibrosis Score is: -.813    NAFLD Score Correlated Fibrosis Severity   <-1.455 F0-F2   -1.455-0.676 Indeterminate Score   >0.676 F3-F4   **Fibrosis Severity Scale: F0 = no fibrosis; F1= mild fibrosis; F2 = moderate fibrosis; F3 = severe fibrosis; F4 = cirrhosis    NAFLD Score Component Values:  Component Value Date   Age: 58 y.o.     BMI: 23.48 kg/m²    IFG or DM: No    AST: 154 U/L 1/15/2024   ALT: 114 U/L 1/15/2024   Platelet: 188 Thousands/uL 1/15/2024   Albumin: 3.5 g/dL 1/15/2024     NAFLD Fibrosis Score is a non-invasive calculation based on patient characteristics and basic routine labs (Age, BMI, AST, ALT, Platelet count, Albumin and presence/absence of insulin resistance).  Mr. Haddad's NAFLD Fibrosis score is F2.  Scores less than -1.455 correlate well with absence of advanced fibrosis, with a high negative predictive value.    I counseled Mr. Haddad on the importance of weight loss through lifestyle modification, primary diet and exercise, and the benefits of seeking input of a dietician/nutritionist as well as consultation with a medical weight loss specialist or bariatric surgery team.    Mr. Haddad will have the testing done as outlined above and follow-up in 3-4  months.    We discussed importance of health care maintenance like colon cancer  screening. Offered colonoscopy and cologuard, he prefers cologuard and we will place order. Also discussed and advised tobacco cessation.    Orders Placed This Encounter   Procedures   • Cologuard   • US right upper quadrant   • Hepatitis A antibody, total   • Hepatitis B surface antibody   • FLY Screen w/ Reflex to Titer/Pattern   • Antimitochondrial antibody   • Anti-smooth muscle antibody, IgG   • IgG, IgA, IgM   • Comprehensive metabolic panel   • Alpha 1 Antitrypsin Phenotype     FUP in 6 months.     HISTORY OF PRESENT ILLNESS     HPI  58-year-old male with history including but not limited to hypertension, active tobacco use, prior alcohol use who presents upon referral from primary care provider to establish care.  Patient overall feeling well subjectively without any complaints, was noted to have elevated LFTs for which she has been referred to the hepatology clinic.    Patient reports that overall has been feeling well without any significant complaints except for intermittent right upper quadrant abdominal pain however denies any significant worsening, distressing by it.  During routine follow-up visit with primary care provider he was noted to have elevated transaminases, as early as October 2023 with AST 76, ALT 56, , T. bili 0.8.  LFTs continue to worsen in January 2024 , , , T. bili 1.1.  He had some additional blood work done at the time which showed INR 1.1, ferritin 805, iron saturation 48, iron 164,, , negative chronic hepatitis panel.  He also had you Elastography done at the time showing F2 fibrosis, S1 steatosis.     On presentation today patient does report that for the past 2 weeks he has completely stopped using alcohol use.  Prior he used to drink nearly 6-12 beers every day.  Continues to use tobacco, 2 packs/day.  Denies any family history of liver disease.  He has not had any prior EGDs or colonoscopies.     REVIEW OF SYSTEMS     CONSTITUTIONAL: Denies  "any fever, chills, rigors, and weight loss.  HEENT: No earache or tinnitus. Denies hearing loss or visual disturbances.  CARDIOVASCULAR: No chest pain or palpitations.   RESPIRATORY: Denies any cough, hemoptysis, shortness of breath or dyspnea on exertion.  GASTROINTESTINAL: As noted in the History of Present Illness.   GENITOURINARY: No problems with urination. Denies any hematuria or dysuria.  NEUROLOGIC: No dizziness or vertigo, denies headaches.   MUSCULOSKELETAL: Denies any muscle or joint pain.   SKIN: Denies skin rashes or itching.   ENDOCRINE: Denies excessive thirst. Denies intolerance to heat or cold.  PSYCHOSOCIAL: Denies depression or anxiety. Denies any recent memory loss.       Historical information     Past Medical History:   Diagnosis Date   • Anxiety    • Cirrhosis (HCC)    • Hypertension      Past Surgical History:   Procedure Laterality Date   • KNEE SURGERY       Social History   Social History     Substance and Sexual Activity   Alcohol Use Yes   • Alcohol/week: 30.0 standard drinks of alcohol   • Types: 30 Cans of beer per week    Comment: daily. Non-alcoholic beer     Social History     Substance and Sexual Activity   Drug Use Never     Social History     Tobacco Use   Smoking Status Every Day   • Current packs/day: 2.00   • Types: Cigarettes   Smokeless Tobacco Never     Family History   Problem Relation Age of Onset   • Heart disease Father    • Heart disease Brother        Allergies       Current Outpatient Medications:   •  ALPRAZolam (XANAX) 0.25 mg tablet  •  amLODIPine (NORVASC) 5 mg tablet  •  escitalopram (LEXAPRO) 20 mg tablet  •  lisinopril (ZESTRIL) 20 mg tablet    No Known Allergies        Objective assessment       Blood pressure 124/72, temperature (!) 97.3 °F (36.3 °C), temperature source Tympanic, height 5' 9\" (1.753 m), weight 72.1 kg (159 lb). Body mass index is 23.48 kg/m².        PHYSICAL EXAM:         General Appearance:   Alert, cooperative, no distress   HEENT:   " Normocephalic     Neck:  symmetrical   Lungs:   Breathing unlabored, able to speak in full sentences    Heart::   Regular rate    Abdomen:   Nontender   Genitalia:   Deferred    Rectal:   Deferred    Extremities:  No cyanosis, or edema    Pulses:  Strong   Skin:  No jaundice, rashes, or lesions    Lymph nodes:  No palpable cervical lymphadenopathy        Lab Results:      No visits with results within 1 Day(s) from this visit.   Latest known visit with results is:   Appointment on 01/24/2024   Component Date Value   • Ferritin 01/24/2024 805 (H)    • Iron Saturation 01/24/2024 48    • TIBC 01/24/2024 340    • Iron 01/24/2024 164    • UIBC 01/24/2024 176    • Vitamin B-12 01/24/2024 351    • GGT 01/24/2024 974 (H)    • Hepatitis B Surface Ag 01/24/2024 Non-reactive    • Hepatitis C Ab 01/24/2024 Non-reactive    • Hep B C IgM 01/24/2024 Non-reactive    • Hep B Core Total Ab 01/24/2024 Non-reactive    • HIV-1 p24 Antigen 01/24/2024 Non-Reactive    • HIV-1 Antibody 01/24/2024 Non-Reactive    • HIV-2 Antibody 01/24/2024 Non-Reactive    • HIV Ag-Ab 5th Gen 01/24/2024 Non-Reactive          Radiology Results:      No results found.      Hakan Turpin MD  Gastroenteroloy Fellow

## 2024-03-11 NOTE — TELEPHONE ENCOUNTER
Reason for call:   [x] Refill   [] Prior Auth  [] Other:     Office:   [x] PCP/Provider -   [] Specialty/Provider -     Medication: Lexapro    Dose/Frequency: 20 mg     Quantity: #30    Pharm[facility]: [facility] [address]    Does the patient have enough for 3 days?   [] Yes   [x] No - Send as HP to POD

## 2024-03-15 ENCOUNTER — HOSPITAL ENCOUNTER (OUTPATIENT)
Dept: ULTRASOUND IMAGING | Facility: HOSPITAL | Age: 59
End: 2024-03-15
Payer: COMMERCIAL

## 2024-03-15 ENCOUNTER — APPOINTMENT (OUTPATIENT)
Dept: LAB | Facility: HOSPITAL | Age: 59
End: 2024-03-15
Payer: COMMERCIAL

## 2024-03-15 DIAGNOSIS — K70.9 ALCOHOLIC LIVER DISEASE (HCC): ICD-10-CM

## 2024-03-15 LAB
ALBUMIN SERPL BCP-MCNC: 4 G/DL (ref 3.5–5)
ALP SERPL-CCNC: 84 U/L (ref 34–104)
ALT SERPL W P-5'-P-CCNC: 43 U/L (ref 7–52)
ANA SER QL IA: NEGATIVE
ANION GAP SERPL CALCULATED.3IONS-SCNC: 8 MMOL/L (ref 4–13)
AST SERPL W P-5'-P-CCNC: 34 U/L (ref 13–39)
BILIRUB SERPL-MCNC: 0.3 MG/DL (ref 0.2–1)
BUN SERPL-MCNC: 12 MG/DL (ref 5–25)
CALCIUM SERPL-MCNC: 9.8 MG/DL (ref 8.4–10.2)
CHLORIDE SERPL-SCNC: 103 MMOL/L (ref 96–108)
CO2 SERPL-SCNC: 29 MMOL/L (ref 21–32)
CREAT SERPL-MCNC: 0.66 MG/DL (ref 0.6–1.3)
GFR SERPL CREATININE-BSD FRML MDRD: 108 ML/MIN/1.73SQ M
GLUCOSE P FAST SERPL-MCNC: 89 MG/DL (ref 65–99)
IGA SERPL-MCNC: 414 MG/DL (ref 66–433)
IGG SERPL-MCNC: 1003 MG/DL (ref 635–1741)
IGM SERPL-MCNC: 72 MG/DL (ref 45–281)
POTASSIUM SERPL-SCNC: 4.3 MMOL/L (ref 3.5–5.3)
PROT SERPL-MCNC: 7.4 G/DL (ref 6.4–8.4)
SODIUM SERPL-SCNC: 140 MMOL/L (ref 135–147)

## 2024-03-15 PROCEDURE — 82784 ASSAY IGA/IGD/IGG/IGM EACH: CPT

## 2024-03-15 PROCEDURE — 82104 ALPHA-1-ANTITRYPSIN PHENO: CPT

## 2024-03-15 PROCEDURE — 86706 HEP B SURFACE ANTIBODY: CPT

## 2024-03-15 PROCEDURE — 76705 ECHO EXAM OF ABDOMEN: CPT

## 2024-03-15 PROCEDURE — 86381 MITOCHONDRIAL ANTIBODY EACH: CPT

## 2024-03-15 PROCEDURE — 80053 COMPREHEN METABOLIC PANEL: CPT

## 2024-03-15 PROCEDURE — 86708 HEPATITIS A ANTIBODY: CPT

## 2024-03-15 PROCEDURE — 82103 ALPHA-1-ANTITRYPSIN TOTAL: CPT

## 2024-03-15 PROCEDURE — 86015 ACTIN ANTIBODY EACH: CPT

## 2024-03-15 PROCEDURE — 86038 ANTINUCLEAR ANTIBODIES: CPT

## 2024-03-16 LAB
ACTIN IGG SERPL-ACNC: 4 UNITS (ref 0–19)
MITOCHONDRIA M2 IGG SER-ACNC: <20 UNITS (ref 0–20)

## 2024-03-17 LAB
HAV AB SER QL IA: NORMAL
HBV SURFACE AB SER-ACNC: <3 MIU/ML

## 2024-03-18 NOTE — RESULT ENCOUNTER NOTE
Hi Mr Haddad  Some of the blood work that you had obtained on 3/15/2024 has not coming back, shows no significant abnormalities.  Does not show any autoimmune conditions affecting your liver.  This is good news.  I am still waiting for some blood work on the same day and I will reach out to you when I have the results.  I would recommend that you discuss getting vaccines against hepatitis A and hepatitis B with your primary care provider Dr. Bear as I see you are not immune.     Sent MCM

## 2024-03-20 ENCOUNTER — PREP FOR PROCEDURE (OUTPATIENT)
Dept: GASTROENTEROLOGY | Facility: CLINIC | Age: 59
End: 2024-03-20

## 2024-03-20 ENCOUNTER — TELEPHONE (OUTPATIENT)
Dept: GASTROENTEROLOGY | Facility: CLINIC | Age: 59
End: 2024-03-20

## 2024-03-20 DIAGNOSIS — R19.5 POSITIVE COLORECTAL CANCER SCREENING USING COLOGUARD TEST: Primary | ICD-10-CM

## 2024-03-20 LAB — COLOGUARD RESULT REPORTABLE: POSITIVE

## 2024-03-20 RX ORDER — POLYETHYLENE GLYCOL 3350 17 G/17G
238 POWDER, FOR SOLUTION ORAL ONCE
Qty: 238 G | Refills: 0 | Status: SHIPPED | OUTPATIENT
Start: 2024-03-20 | End: 2024-03-20

## 2024-03-20 NOTE — TELEPHONE ENCOUNTER
I called and spoke to the patient as well as his wife . Discussed blood work showing improvement in liver numbers with ongoing alcohol avoidance.  Discussed thatWe have ruled out other rare etiologies of liver disease like autoimmune conditions as well as viral infections.  Likely his liver disease is because of prior alcohol use which now seems to be improving.  I have recommended that he should obtain vaccination against hepatitis A and hepatitis B, should discuss it with his primary care provider.  I have also discussed that Cologuard test was positive, this should be followed up with the colonoscopy.  Have discussed that there is 40% false positive rates of Cologuard however in order to be sure there is no underlying premalignant or malignant lesions we have to do a colonoscopy.  They both expressed understanding, discussed that they will look for somebody to help him with a ride and transport and set up colonoscopy.  I will send a message to office scheduling team for the same.

## 2024-03-21 ENCOUNTER — TELEPHONE (OUTPATIENT)
Dept: GASTROENTEROLOGY | Facility: CLINIC | Age: 59
End: 2024-03-21

## 2024-03-21 LAB
A1AT PHENOTYP SERPL IFE: ABNORMAL
A1AT SERPL-MCNC: 188 MG/DL (ref 101–187)

## 2024-03-21 NOTE — TELEPHONE ENCOUNTER
Spoke to pt. In regards to scheduling colonoscopy and he stated that he didn't want to schedule at this time and would call back when he is ready to do so.

## 2024-03-21 NOTE — TELEPHONE ENCOUNTER
----- Message from Hakan Turpin MD sent at 3/20/2024  6:33 PM EDT -----  Hi,  Can you help set up colonoscopy for the patient for positive Cologuard, I spoke to the patient about the results are ready, they are going to work in the next few days to establish a ride and transport for the patient.

## 2024-03-22 NOTE — RESULT ENCOUNTER NOTE
Hi Mr. Haddad  Happy to report that the ultrasound did not show any concerning lesions, but shows few simple liver cysts which do not require any attention.  It does show fatty changes as we discussed in the office.  Please let me know if you have any questions or concerns.  Overall I am not worried about any of these results.    Please let me know or call our office if you have any questions or concerns.

## 2024-03-25 ENCOUNTER — OFFICE VISIT (OUTPATIENT)
Dept: FAMILY MEDICINE CLINIC | Facility: CLINIC | Age: 59
End: 2024-03-25
Payer: COMMERCIAL

## 2024-03-25 VITALS
SYSTOLIC BLOOD PRESSURE: 132 MMHG | DIASTOLIC BLOOD PRESSURE: 74 MMHG | WEIGHT: 167.4 LBS | BODY MASS INDEX: 24.79 KG/M2 | HEART RATE: 72 BPM | HEIGHT: 69 IN | OXYGEN SATURATION: 99 % | TEMPERATURE: 97.9 F

## 2024-03-25 DIAGNOSIS — K21.00 GASTROESOPHAGEAL REFLUX DISEASE WITH ESOPHAGITIS WITHOUT HEMORRHAGE: Primary | ICD-10-CM

## 2024-03-25 DIAGNOSIS — K70.9 ALCOHOLIC LIVER DISEASE (HCC): ICD-10-CM

## 2024-03-25 DIAGNOSIS — K21.00 GASTROESOPHAGEAL REFLUX DISEASE WITH ESOPHAGITIS WITHOUT HEMORRHAGE: ICD-10-CM

## 2024-03-25 DIAGNOSIS — F41.9 ANXIETY: ICD-10-CM

## 2024-03-25 DIAGNOSIS — I10 PRIMARY HYPERTENSION: ICD-10-CM

## 2024-03-25 DIAGNOSIS — F32.A DEPRESSION, UNSPECIFIED DEPRESSION TYPE: ICD-10-CM

## 2024-03-25 DIAGNOSIS — Z23 ENCOUNTER FOR IMMUNIZATION: ICD-10-CM

## 2024-03-25 DIAGNOSIS — Z00.00 ANNUAL PHYSICAL EXAM: ICD-10-CM

## 2024-03-25 DIAGNOSIS — I10 HYPERTENSION, UNSPECIFIED TYPE: ICD-10-CM

## 2024-03-25 PROCEDURE — 99396 PREV VISIT EST AGE 40-64: CPT | Performed by: FAMILY MEDICINE

## 2024-03-25 PROCEDURE — 90471 IMMUNIZATION ADMIN: CPT | Performed by: FAMILY MEDICINE

## 2024-03-25 PROCEDURE — 90746 HEPB VACCINE 3 DOSE ADULT IM: CPT | Performed by: FAMILY MEDICINE

## 2024-03-25 PROCEDURE — 99214 OFFICE O/P EST MOD 30 MIN: CPT | Performed by: FAMILY MEDICINE

## 2024-03-25 RX ORDER — PANTOPRAZOLE SODIUM 40 MG/1
40 TABLET, DELAYED RELEASE ORAL
Qty: 30 TABLET | Refills: 3 | Status: SHIPPED | OUTPATIENT
Start: 2024-03-25 | End: 2024-03-26

## 2024-03-25 NOTE — RESULT ENCOUNTER NOTE
Had discussed over phone during discussion of cologuard. Had recommended hepatitis A and B vaccination with primary doctor.

## 2024-03-25 NOTE — PROGRESS NOTES
Assessment/Plan:          1. Gastroesophageal reflux disease with esophagitis without hemorrhage  -     pantoprazole (PROTONIX) 40 mg tablet; Take 1 tablet (40 mg total) by mouth daily in the early morning    2. Hypertension, unspecified type    3. Depression, unspecified depression type    4. Anxiety    5. Alcoholic liver disease (HCC)  Assessment & Plan:  Discussed imaging results with the patient in detail. He will receive his hepatitis B vaccine adult intramuscular today 03/25/2024. He will recieve his second dose in 1 month, and the last dose in 6 months after the initial dose.      6. Primary hypertension    7. Annual physical exam    8. Encounter for immunization  -     HEPATITIS B VACCINE ADULT IM    Cough.  I suspect it is reflux from the stomach up into the esophagus causing the cough. I will start him on an acid stopper for 1 month. If the cough does not resolve, we discussed potentially ordering an x-ray for further evaluation.    Health maintenance.  Discussed risk and benefits with his intake of Xanax. We discussed prostate cancer screening. Recommended to partake in a healthy diet, emphasizing the importance of consuming fruits, vegetables, and adequate fiber to regulate bowel movements. Recommended he shedules regular dental visits. Encourage to floss. We discussed dental health, injury prevention, sexual health, and exercise. Discussed that he will bring his living will to be filed in the system.    Follow-up  The patient will follow up in 3 months.    Immunizations and preventive care screenings were discussed with patient today. Appropriate education was printed on patient's after visit summary.        US elastography done 1/24/24:  IMPRESSION:     Metavir score: F2, Significant Fibrosis.     According to the updated SRU consensus statement, a liver stiffness of 8.79 kPa, which in the absence of other known clinical signs*, rules out compensated advanced chronic liver disease (cACLD). If there  "are known clinical signs, may need further test   for confirmation.  https://pubs.rsna.org/doi/10.1148/radiol.5609931172     Liver steatosis grading: S1 ( 5%-33% steatosis)          Subjective:       Patient ID: Sha Haddad is a 58 y.o. male who is a fairly new patient to me presents for a comprehensive physical exam. He is accompanied by an adult female.    He experiences an attack in the form of a dry cough that wakes him up at nighttime. He is unsure about the onset of his symptoms and uncertain about the cause. He does not produce any sputum when he coughs. His cough persists for a while which subsequently resolves.  He denies any indigestion, warmth, flushing, or irritation.    They were informed by Dr. Turpin that the patient's liver numbers decreased. They were also informed that the condition of his liver is a stage 2 and that \"it is reversing itself.\" He has discontinued alcohol consumption. He underwent elastography but was unsure about the results. He also states that Dr. Turpin ordered another imaging, where he believes it was a standard ultrasound.    He is on amlodipine 5 mg and lisinopril 20 mg for his blood pressure. He takes escitalopram 20 mg and alprazolam (Xanax). He takes vitamin B12, vitamin B complex, vitamin D, and multivitamins.    He denies any known urinary issues. He has a living will.    He is not working. He continues to smoke.    Diet and Physical Activity   - Exercise: He does not engage in any exercise at the moment.    General Health   - Vision: He denies any vision problems. He is wearing glasses.   - Dental: No regular dental visists. He brushes his teeth once a day. He does not floss.    Ultrasound elastography done on 01/24/2024 was reviewed and discussed with the patient in detail.    Ultrasound of the upper right quadrant by Dr. Turpin done Friday of 03/2024 was reviewed and discussed with the patient in detail.    Alpha-1 antitrypsin was top normal at 188 mg/dL. Other Lab " "works were discussed and reviewed with the patient.    The following portions of the patient's history were reviewed and updated as appropriate: allergies, current medications, past family history, past medical history, past social history, past surgical history, and problem list.            Objective:       /74 (BP Location: Left arm, Patient Position: Sitting, Cuff Size: Standard)   Pulse 72   Temp 97.9 °F (36.6 °C) (Temporal)   Ht 5' 9\" (1.753 m)   Wt 75.9 kg (167 lb 6.4 oz)   SpO2 99%   BMI 24.72 kg/m²          Physical Exam  Vitals and nursing note reviewed.   Constitutional:       General: He is not in acute distress.     Appearance: Normal appearance. He is well-developed.   HENT:      Head: Normocephalic and atraumatic.   Eyes:      General:         Right eye: No discharge.         Left eye: No discharge.  Neck:      Thyroid: No thyromegaly.   Cardiovascular:      Rate and Rhythm: Normal rate and regular rhythm.      Pulses: Normal pulses.      Heart sounds: Normal heart sounds. No murmur heard.  Pulmonary:      Effort: Pulmonary effort is normal.      Breath sounds: Normal breath sounds. No wheezing or rhonchi.   Musculoskeletal:      Cervical back: Neck supple.      Right lower leg: No edema.      Left lower leg: No edema.   Lymphadenopathy:      Cervical: No cervical adenopathy.   Skin:     General: Skin is warm.      Capillary Refill: Capillary refill takes less than 2 seconds.   Neurological:      General: No focal deficit present.      Mental Status: He is alert and oriented to person, place, and time.   Psychiatric:         Mood and Affect: Mood normal.         Behavior: Behavior normal.         Thought Content: Thought content normal.        I personally reviewed the recent (and prior)  lab results, the image studies, pathology, other specialty/physicians consult notes and recommendations, and outside medical records from other institutions, as appropriate. I had a lengthy discussion with " the patient and shared the work-up findings. We discussed the diagnosis and management plan.  I spent  45  minutes reviewing the records (labs, clinician notes, outside records, medical history, ordering medicine/tests/procedures, interpreting the imaging/labs previously done) and coordination of care as well as direct time with the patient today, of which greater than 50% of the time was spent in counseling and coordination of care with the patient/family.    Transcribed for KIRK Bear DO, by Vikash Hope on 03/25/24 at 7:15 PM. Powered by Dragon [employer].

## 2024-03-25 NOTE — ASSESSMENT & PLAN NOTE
Discussed imaging results with the patient in detail. He will receive his hepatitis B vaccine adult intramuscular today 03/25/2024. He will recieve his second dose in 1 month, and the last dose in 6 months after the initial dose.

## 2024-03-25 NOTE — PROGRESS NOTES
ADULT ANNUAL PHYSICAL  [facility] [place]    NAME: Sha Haddad  AGE: 58 y.o. SEX: male  : 1965     DATE: 3/25/2024     Assessment and Plan:     Problem List Items Addressed This Visit        Cardiovascular and Mediastinum    Primary hypertension    Hypertension       Digestive    Alcoholic liver disease (HCC)    Gastroesophageal reflux disease with esophagitis without hemorrhage - Primary    Relevant Medications    pantoprazole (PROTONIX) 40 mg tablet       Behavioral Health    Anxiety   Other Visit Diagnoses     Depression, unspecified depression type        Annual physical exam        Encounter for immunization        Relevant Orders    HEPATITIS B VACCINE ADULT IM          Immunizations and preventive care screenings were discussed with patient today. Appropriate education was printed on patient's after visit summary.    Discussed risks and benefits of prostate cancer screening. We discussed the controversial history of PSA screening for prostate cancer in the [place] as well as the risk of over detection and over treatment of prostate cancer by way of PSA screening.  The patient understands that PSA blood testing is an imperfect way to screen for prostate cancer and that elevated PSA levels in the blood may also be caused by infection, inflammation, prostatic trauma or manipulation, urological procedures, or by benign prostatic enlargement.    The role of the digital rectal examination in prostate cancer screening was also discussed and I discussed with him that there is large interobserver variability in the findings of digital rectal examination.    Counseling:  Alcohol/drug use: discussed moderation in alcohol intake, the recommendations for healthy alcohol use, and avoidance of illicit drug use.  Dental Health: discussed importance of regular tooth brushing, flossing, and dental visits.  Injury prevention: discussed safety/seat belts, safety  helmets, smoke detectors, carbon dioxide detectors, and smoking near bedding or upholstery.  Sexual health: discussed sexually transmitted diseases, partner selection, use of condoms, avoidance of unintended pregnancy, and contraceptive alternatives.  Exercise: the importance of regular exercise/physical activity was discussed. Recommend exercise 3-5 times per week for at least 30 minutes.          Return in about 3 months (around 6/25/2024) for Recheck.     Chief Complaint:     Chief Complaint   Patient presents with   • Annual Exam     Yearly physical       History of Present Illness:     Adult Annual Physical   Patient here for a comprehensive physical exam. The patient reports no problems.    Diet and Physical Activity  Diet/Nutrition: well balanced diet, heart healthy (low sodium) diet, limited junk food, low fat diet, limited fruits/vegetables, and adequate fiber intake.   Exercise: no formal exercise.      Depression Screening  PHQ-2/9 Depression Screening    Little interest or pleasure in doing things: 0 - not at all  Feeling down, depressed, or hopeless: 0 - not at all  PHQ-2 Score: 0  PHQ-2 Interpretation: Negative depression screen       General Health  Sleep:    .   Hearing: normal - bilateral.  Vision: no vision problems and wears glasses.   Dental: no dental visits for >1 year, brushes teeth once daily, and does not floss.        Health  Symptoms include: none    Advanced Care Planning  Do you have an advanced directive? yes  Do you have a durable medical power of ? no  ACP document given to patient? no     Review of Systems:     Review of Systems   Past Medical History:     Past Medical History:   Diagnosis Date   • Anxiety    • Cirrhosis (HCC)    • Hypertension       Past Surgical History:     Past Surgical History:   Procedure Laterality Date   • KNEE SURGERY        Family History:     Family History   Problem Relation Age of Onset   • Heart disease Father    • Heart disease Brother        Social History:     Social History     Socioeconomic History   • Marital status: /Civil Union     Spouse name: None   • Number of children: None   • Years of education: None   • Highest education level: None   Occupational History   • None   Tobacco Use   • Smoking status: Every Day     Current packs/day: 2.00     Average packs/day: 2.0 packs/day for 25.0 years (50.0 ttl pk-yrs)     Types: Cigarettes, Cigars   • Smokeless tobacco: Never   Vaping Use   • Vaping status: Never Used   Substance and Sexual Activity   • Alcohol use: Not Currently     Alcohol/week: 30.0 standard drinks of alcohol     Types: 30 Cans of beer per week     Comment: daily. Non-alcoholic beer -- quit   • Drug use: Never   • Sexual activity: Yes     Partners: Female     Birth control/protection: None   Other Topics Concern   • None   Social History Narrative    Most recent tobacco use screenin2019      Do you currently or have you served in the [employer]:   No      Were you activated, into active duty, as a member of the [employer] or as a Reservist:   No      Exercise level:   None      Diet:   Regular      Has smoked since age:   18      Caffeine intake:   Moderate      Sunscreen used routinely:   No      Smoke alarm in home:   Yes      Live alone or with others:   with others      Are there stairs in your home:   Yes    Per [place]      Social Determinants of Health     Financial Resource Strain: Not on file   Food Insecurity: Not on file   Transportation Needs: Not on file   Physical Activity: Not on file   Stress: Not on file   Social Connections: Not on file   Intimate Partner Violence: Not on file   Housing Stability: Not on file      Current Medications:     Current Outpatient Medications   Medication Sig Dispense Refill   • ALPRAZolam (XANAX) 0.25 mg tablet Take 1 tablet (0.25 mg total) by mouth daily as needed for anxiety Use very sparingly 30 tablet 0   • amLODIPine (NORVASC) 5 mg tablet TAKE 1 TABLET(5 MG)  "BY MOUTH DAILY 90 tablet 1   • escitalopram (LEXAPRO) 20 mg tablet Take 1 tablet (20 mg total) by mouth daily 30 tablet 5   • lisinopril (ZESTRIL) 20 mg tablet Take 1 tablet (20 mg total) by mouth daily 90 tablet 3   • pantoprazole (PROTONIX) 40 mg tablet Take 1 tablet (40 mg total) by mouth daily in the early morning 30 tablet 3   • polyethylene glycol (MiraLax) 17 GM/SCOOP powder Take 238 g by mouth once for 1 dose Take 238 g my mouth. Use as directed (Patient not taking: Reported on 3/25/2024) 238 g 0     No current facility-administered medications for this visit.      Allergies:     No Known Allergies   Physical Exam:     /74 (BP Location: Left arm, Patient Position: Sitting, Cuff Size: Standard)   Pulse 72   Temp 97.9 °F (36.6 °C) (Temporal)   Ht 5' 9\" (1.753 m)   Wt 75.9 kg (167 lb 6.4 oz)   SpO2 99%   BMI 24.72 kg/m²     Physical Exam --0see above    G Sha Bear DO  [facility] [place]    "

## 2024-03-26 RX ORDER — PANTOPRAZOLE SODIUM 40 MG/1
TABLET, DELAYED RELEASE ORAL
Qty: 90 TABLET | Refills: 1 | Status: SHIPPED | OUTPATIENT
Start: 2024-03-26

## 2024-04-25 ENCOUNTER — CLINICAL SUPPORT (OUTPATIENT)
Dept: FAMILY MEDICINE CLINIC | Facility: CLINIC | Age: 59
End: 2024-04-25
Payer: COMMERCIAL

## 2024-04-25 DIAGNOSIS — Z23 ENCOUNTER FOR IMMUNIZATION: Primary | ICD-10-CM

## 2024-04-25 DIAGNOSIS — K70.9 ALCOHOLIC LIVER DISEASE (HCC): ICD-10-CM

## 2024-04-25 PROCEDURE — 90632 HEPA VACCINE ADULT IM: CPT

## 2024-04-25 PROCEDURE — 90471 IMMUNIZATION ADMIN: CPT

## 2024-04-25 PROCEDURE — 90746 HEPB VACCINE 3 DOSE ADULT IM: CPT

## 2024-04-25 PROCEDURE — 90472 IMMUNIZATION ADMIN EACH ADD: CPT

## 2024-04-25 NOTE — PROGRESS NOTES
Pt received first dose of Hep A and second dose of Hep B and will return in 6 mo for final dose of each vaccination. No complaints at this time.

## 2024-07-02 ENCOUNTER — OFFICE VISIT (OUTPATIENT)
Dept: FAMILY MEDICINE CLINIC | Facility: CLINIC | Age: 59
End: 2024-07-02
Payer: COMMERCIAL

## 2024-07-02 VITALS
DIASTOLIC BLOOD PRESSURE: 86 MMHG | SYSTOLIC BLOOD PRESSURE: 136 MMHG | HEART RATE: 71 BPM | WEIGHT: 166.4 LBS | OXYGEN SATURATION: 97 % | HEIGHT: 69 IN | BODY MASS INDEX: 24.65 KG/M2 | TEMPERATURE: 98.6 F

## 2024-07-02 DIAGNOSIS — Z13.83 SCREENING FOR CARDIOVASCULAR, RESPIRATORY, AND GENITOURINARY DISEASES: ICD-10-CM

## 2024-07-02 DIAGNOSIS — Z79.899 ON LONG TERM DRUG THERAPY: ICD-10-CM

## 2024-07-02 DIAGNOSIS — E78.2 MIXED HYPERLIPIDEMIA: ICD-10-CM

## 2024-07-02 DIAGNOSIS — R53.82 CHRONIC FATIGUE: ICD-10-CM

## 2024-07-02 DIAGNOSIS — K70.9 ALCOHOLIC LIVER DISEASE (HCC): ICD-10-CM

## 2024-07-02 DIAGNOSIS — Z13.89 SCREENING FOR CARDIOVASCULAR, RESPIRATORY, AND GENITOURINARY DISEASES: ICD-10-CM

## 2024-07-02 DIAGNOSIS — Z12.5 SCREENING FOR PROSTATE CANCER: ICD-10-CM

## 2024-07-02 DIAGNOSIS — K21.9 GASTROESOPHAGEAL REFLUX DISEASE WITHOUT ESOPHAGITIS: Primary | ICD-10-CM

## 2024-07-02 DIAGNOSIS — E55.9 VITAMIN D DEFICIENCY: ICD-10-CM

## 2024-07-02 DIAGNOSIS — Z13.6 SCREENING FOR CARDIOVASCULAR, RESPIRATORY, AND GENITOURINARY DISEASES: ICD-10-CM

## 2024-07-02 DIAGNOSIS — K21.00 GASTROESOPHAGEAL REFLUX DISEASE WITH ESOPHAGITIS WITHOUT HEMORRHAGE: ICD-10-CM

## 2024-07-02 DIAGNOSIS — I10 PRIMARY HYPERTENSION: ICD-10-CM

## 2024-07-02 DIAGNOSIS — R69 MULTIPLE MEDICAL PROBLEMS: ICD-10-CM

## 2024-07-02 DIAGNOSIS — Z13.89 SCREENING FOR HEMATURIA OR PROTEINURIA: ICD-10-CM

## 2024-07-02 PROCEDURE — 99214 OFFICE O/P EST MOD 30 MIN: CPT | Performed by: FAMILY MEDICINE

## 2024-07-02 RX ORDER — FAMOTIDINE 40 MG/1
40 TABLET, FILM COATED ORAL
Qty: 90 TABLET | Refills: 3 | Status: SHIPPED | OUTPATIENT
Start: 2024-07-02 | End: 2025-06-27

## 2024-07-02 RX ORDER — FLUTICASONE PROPIONATE 50 MCG
1 SPRAY, SUSPENSION (ML) NASAL DAILY
Qty: 18 G | Refills: 6 | Status: SHIPPED | OUTPATIENT
Start: 2024-07-02

## 2024-07-02 NOTE — PROGRESS NOTES
Ambulatory Visit  Name: Sha Haddad      : 1965      MRN: [id number]  Encounter Provider: KIRK Bear DO  Encounter Date: 2024   Encounter department: [facility] PRIMARY [facility]    Assessment & Plan  1. GERD.  The patient's pantoprazole will be discontinued and replaced with Pepcid 40 mg, to be taken once daily at bedtime.    2. Cough and wheezing.  A prescription for fluticasone has been issued.    3. Health maintenance.  The patient's alkaline phosphatase levels have decreased from 267 to 84. Bilirubin levels have also decreased from 1.8 to 1.18. Kidney function is satisfactory. The patient has been advised to receive hepatitis A and B vaccinations with his primary care physician. Blood tests have been ordered.    Follow-up  A follow-up appointment is scheduled for 3 to 4 months from now.  No orders of the defined types were placed in this encounter.       1. Gastroesophageal reflux disease without esophagitis  -     famotidine (PEPCID) 40 MG tablet; Take 1 tablet (40 mg total) by mouth daily at bedtime  -     fluticasone (FLONASE) 50 mcg/act nasal spray; 1 spray into each nostril daily  2. Alcoholic liver disease (HCC)  -     CBC; Future  -     UA w Reflex to Microscopic w Reflex to Culture  -     Comprehensive metabolic panel; Future  -     Lipid panel; Future  -     TSH, 3rd generation; Future  -     Vitamin D 25 hydroxy; Future  -     Hemoglobin A1C; Future  -     PSA, Total Screen; Future  -     Albumin / creatinine urine ratio  -     Gamma GT; Future; Expected date: 2024  3. Primary hypertension  4. Gastroesophageal reflux disease with esophagitis without hemorrhage  5. Mixed hyperlipidemia  -     Lipid panel; Future  6. Multiple medical problems  7. Vitamin D deficiency  -     Vitamin D 25 hydroxy; Future  8. Chronic fatigue  -     CBC; Future  -     TSH, 3rd generation; Future  9. Screening for hematuria or proteinuria  -     UA w Reflex to Microscopic w Reflex to Culture  -  "    Albumin / creatinine urine ratio  10. Screening for cardiovascular, respiratory, and genitourinary diseases  -     Comprehensive metabolic panel; Future  11. On long term drug therapy  -     Hemoglobin A1C; Future  12. Screening for prostate cancer  -     PSA, Total Screen; Future      History of Present Illness     History of Present Illness  The patient is a 58-year-old male who presents for evaluation of multiple medical concerns.    The patient reports a general sense of well-being, however, he experiences nocturnal episodes of choking, characterized by wheezing and coughing.     Review of Systems   Constitutional:  Negative for chills and fever.   HENT:  Negative for ear pain and sore throat.    Eyes:  Negative for pain and visual disturbance.   Respiratory:  Negative for cough and shortness of breath.    Cardiovascular:  Negative for chest pain and palpitations.   Gastrointestinal:  Negative for abdominal pain and vomiting.   Genitourinary:  Negative for dysuria and hematuria.   Musculoskeletal:  Negative for arthralgias and back pain.   Skin:  Negative for color change and rash.   Neurological:  Negative for seizures and syncope.   All other systems reviewed and are negative.    Objective     /86 (BP Location: Left arm, Patient Position: Sitting, Cuff Size: Standard)   Pulse 71   Temp 98.6 °F (37 °C) (Temporal)   Ht 5' 9\" (1.753 m)   Wt 75.5 kg (166 lb 6.4 oz)   SpO2 97%   BMI 24.57 kg/m²     Physical Exam    Physical Exam  Vitals and nursing note reviewed.   Constitutional:       General: He is not in acute distress.     Appearance: Normal appearance. He is well-developed. He is not ill-appearing, toxic-appearing or diaphoretic.   HENT:      Head: Normocephalic and atraumatic.      Nose: Nose normal.      Mouth/Throat:      Mouth: Mucous membranes are moist.   Eyes:      Extraocular Movements: Extraocular movements intact.      Conjunctiva/sclera: Conjunctivae normal.      Pupils: Pupils are " equal, round, and reactive to light.   Cardiovascular:      Rate and Rhythm: Normal rate and regular rhythm.      Pulses: Normal pulses.      Heart sounds: Normal heart sounds. No murmur heard.     No friction rub.   Pulmonary:      Effort: Pulmonary effort is normal. No respiratory distress.      Breath sounds: Normal breath sounds. No stridor. No wheezing or rhonchi.   Abdominal:      Palpations: Abdomen is soft.      Tenderness: There is no abdominal tenderness.   Musculoskeletal:         General: No swelling.      Cervical back: Neck supple.   Skin:     General: Skin is warm and dry.      Coloration: Skin is not jaundiced or pale.      Findings: No erythema or rash.   Neurological:      General: No focal deficit present.      Mental Status: He is alert and oriented to person, place, and time. Mental status is at baseline.   Psychiatric:         Mood and Affect: Mood normal.         Behavior: Behavior normal.         Thought Content: Thought content normal.         Judgment: Judgment normal.       Administrative Statements   I have spent a total time of 30 minutes in caring for this patient on the day of the visit/encounter including Diagnostic results, Prognosis, Risks and benefits of tx options, Instructions for management, Patient and family education, Importance of tx compliance, Risk factor reductions, Impressions, Counseling / Coordination of care, Documenting in the medical record, Reviewing / ordering tests, medicine, procedures  , and Obtaining or reviewing history  .

## 2024-08-27 ENCOUNTER — TELEPHONE (OUTPATIENT)
Dept: GASTROENTEROLOGY | Facility: CLINIC | Age: 59
End: 2024-08-27

## 2024-09-03 DIAGNOSIS — F41.9 ANXIETY: ICD-10-CM

## 2024-09-03 NOTE — TELEPHONE ENCOUNTER
Patient wife called the RX Refill Line.  Requesting a refill on Alprazolam 0.25 mg. Could direction be changed to take at bedtime along to take with Escitalopram. Patient having bad anxiety attacks at night. Please send       [email or web address] #[id number] - [place] [place] - [phone] -[phone]       Any question Please contact patient wife at [phone]

## 2024-09-04 RX ORDER — ALPRAZOLAM 0.25 MG
0.25 TABLET ORAL
Qty: 30 TABLET | Refills: 0 | Status: SHIPPED | OUTPATIENT
Start: 2024-09-04

## 2024-09-09 DIAGNOSIS — I10 ESSENTIAL HYPERTENSION: ICD-10-CM

## 2024-09-09 RX ORDER — AMLODIPINE BESYLATE 5 MG/1
5 TABLET ORAL DAILY
Qty: 90 TABLET | Refills: 1 | Status: SHIPPED | OUTPATIENT
Start: 2024-09-09

## 2024-10-04 ENCOUNTER — TELEPHONE (OUTPATIENT)
Age: 59
End: 2024-10-04

## 2024-10-09 ENCOUNTER — APPOINTMENT (OUTPATIENT)
Dept: LAB | Facility: HOSPITAL | Age: 59
End: 2024-10-09
Payer: COMMERCIAL

## 2024-10-09 DIAGNOSIS — Z79.899 ON LONG TERM DRUG THERAPY: ICD-10-CM

## 2024-10-09 DIAGNOSIS — R53.82 CHRONIC FATIGUE: ICD-10-CM

## 2024-10-09 DIAGNOSIS — Z13.6 SCREENING FOR CARDIOVASCULAR, RESPIRATORY, AND GENITOURINARY DISEASES: ICD-10-CM

## 2024-10-09 DIAGNOSIS — Z13.89 SCREENING FOR CARDIOVASCULAR, RESPIRATORY, AND GENITOURINARY DISEASES: ICD-10-CM

## 2024-10-09 DIAGNOSIS — E78.2 MIXED HYPERLIPIDEMIA: ICD-10-CM

## 2024-10-09 DIAGNOSIS — Z12.5 SCREENING FOR PROSTATE CANCER: ICD-10-CM

## 2024-10-09 DIAGNOSIS — Z13.83 SCREENING FOR CARDIOVASCULAR, RESPIRATORY, AND GENITOURINARY DISEASES: ICD-10-CM

## 2024-10-09 DIAGNOSIS — K70.9 ALCOHOLIC LIVER DISEASE (HCC): ICD-10-CM

## 2024-10-09 DIAGNOSIS — E55.9 VITAMIN D DEFICIENCY: ICD-10-CM

## 2024-10-09 LAB
25(OH)D3 SERPL-MCNC: 12 NG/ML (ref 30–100)
ALBUMIN SERPL BCG-MCNC: 3.7 G/DL (ref 3.5–5)
ALP SERPL-CCNC: 200 U/L (ref 34–104)
ALT SERPL W P-5'-P-CCNC: 167 U/L (ref 7–52)
ANION GAP SERPL CALCULATED.3IONS-SCNC: 10 MMOL/L (ref 4–13)
AST SERPL W P-5'-P-CCNC: 307 U/L (ref 13–39)
BILIRUB SERPL-MCNC: 1.31 MG/DL (ref 0.2–1)
BILIRUB UR QL STRIP: NEGATIVE
BUN SERPL-MCNC: 14 MG/DL (ref 5–25)
CALCIUM SERPL-MCNC: 9.6 MG/DL (ref 8.4–10.2)
CHLORIDE SERPL-SCNC: 97 MMOL/L (ref 96–108)
CHOLEST SERPL-MCNC: 256 MG/DL
CLARITY UR: CLEAR
CO2 SERPL-SCNC: 30 MMOL/L (ref 21–32)
COLOR UR: YELLOW
CREAT SERPL-MCNC: 0.77 MG/DL (ref 0.6–1.3)
CREAT UR-MCNC: 113.5 MG/DL
ERYTHROCYTE [DISTWIDTH] IN BLOOD BY AUTOMATED COUNT: 15.4 % (ref 11.6–15.1)
EST. AVERAGE GLUCOSE BLD GHB EST-MCNC: 97 MG/DL
GFR SERPL CREATININE-BSD FRML MDRD: 101 ML/MIN/1.73SQ M
GGT SERPL-CCNC: 1426 U/L (ref 9–64)
GLUCOSE P FAST SERPL-MCNC: 115 MG/DL (ref 65–99)
GLUCOSE UR STRIP-MCNC: NEGATIVE MG/DL
HBA1C MFR BLD: 5 %
HCT VFR BLD AUTO: 39.5 % (ref 36.5–49.3)
HDLC SERPL-MCNC: 24 MG/DL
HGB BLD-MCNC: 13.1 G/DL (ref 12–17)
HGB UR QL STRIP.AUTO: NEGATIVE
KETONES UR STRIP-MCNC: NEGATIVE MG/DL
LEUKOCYTE ESTERASE UR QL STRIP: NEGATIVE
MCH RBC QN AUTO: 32.7 PG (ref 26.8–34.3)
MCHC RBC AUTO-ENTMCNC: 33.2 G/DL (ref 31.4–37.4)
MCV RBC AUTO: 99 FL (ref 82–98)
MICROALBUMIN UR-MCNC: 17 MG/L
MICROALBUMIN/CREAT 24H UR: 15 MG/G CREATININE (ref 0–30)
NITRITE UR QL STRIP: NEGATIVE
NONHDLC SERPL-MCNC: 232 MG/DL
PH UR STRIP.AUTO: 6 [PH]
PLATELET # BLD AUTO: 135 THOUSANDS/UL (ref 149–390)
PMV BLD AUTO: 9.5 FL (ref 8.9–12.7)
POTASSIUM SERPL-SCNC: 3.9 MMOL/L (ref 3.5–5.3)
PROT SERPL-MCNC: 7.2 G/DL (ref 6.4–8.4)
PROT UR STRIP-MCNC: NEGATIVE MG/DL
PSA SERPL-MCNC: 0.44 NG/ML (ref 0–4)
RBC # BLD AUTO: 4.01 MILLION/UL (ref 3.88–5.62)
SODIUM SERPL-SCNC: 137 MMOL/L (ref 135–147)
SP GR UR STRIP.AUTO: >=1.03 (ref 1–1.03)
TRIGL SERPL-MCNC: 810 MG/DL
TSH SERPL DL<=0.05 MIU/L-ACNC: 3.75 UIU/ML (ref 0.45–4.5)
UROBILINOGEN UR QL STRIP.AUTO: 0.2 E.U./DL
WBC # BLD AUTO: 3.9 THOUSAND/UL (ref 4.31–10.16)

## 2024-10-09 PROCEDURE — 84443 ASSAY THYROID STIM HORMONE: CPT

## 2024-10-09 PROCEDURE — 83036 HEMOGLOBIN GLYCOSYLATED A1C: CPT

## 2024-10-09 PROCEDURE — 80053 COMPREHEN METABOLIC PANEL: CPT

## 2024-10-09 PROCEDURE — 82306 VITAMIN D 25 HYDROXY: CPT

## 2024-10-09 PROCEDURE — G0103 PSA SCREENING: HCPCS

## 2024-10-09 PROCEDURE — 82977 ASSAY OF GGT: CPT

## 2024-10-09 PROCEDURE — 85027 COMPLETE CBC AUTOMATED: CPT

## 2024-10-09 PROCEDURE — 80061 LIPID PANEL: CPT

## 2024-10-09 PROCEDURE — 36415 COLL VENOUS BLD VENIPUNCTURE: CPT

## 2024-10-09 NOTE — TELEPHONE ENCOUNTER
Called pt mobile number and lvm advising call back to further discuss request for gluten sensitivity BW. Will await call back.

## 2024-10-10 ENCOUNTER — OFFICE VISIT (OUTPATIENT)
Dept: FAMILY MEDICINE CLINIC | Facility: CLINIC | Age: 59
End: 2024-10-10
Payer: COMMERCIAL

## 2024-10-10 ENCOUNTER — TELEPHONE (OUTPATIENT)
Dept: FAMILY MEDICINE CLINIC | Facility: CLINIC | Age: 59
End: 2024-10-10

## 2024-10-10 VITALS
HEART RATE: 77 BPM | OXYGEN SATURATION: 99 % | BODY MASS INDEX: 24.5 KG/M2 | SYSTOLIC BLOOD PRESSURE: 134 MMHG | HEIGHT: 69 IN | WEIGHT: 165.4 LBS | TEMPERATURE: 97.5 F | DIASTOLIC BLOOD PRESSURE: 82 MMHG

## 2024-10-10 DIAGNOSIS — R69 MULTIPLE MEDICAL PROBLEMS: ICD-10-CM

## 2024-10-10 DIAGNOSIS — Z23 ENCOUNTER FOR IMMUNIZATION: ICD-10-CM

## 2024-10-10 DIAGNOSIS — E55.9 VITAMIN D DEFICIENCY: ICD-10-CM

## 2024-10-10 DIAGNOSIS — I10 PRIMARY HYPERTENSION: ICD-10-CM

## 2024-10-10 DIAGNOSIS — K70.9 ALCOHOLIC LIVER DISEASE (HCC): Primary | ICD-10-CM

## 2024-10-10 DIAGNOSIS — F41.0 PANIC ATTACKS: ICD-10-CM

## 2024-10-10 PROCEDURE — 90632 HEPA VACCINE ADULT IM: CPT | Performed by: FAMILY MEDICINE

## 2024-10-10 PROCEDURE — 90746 HEPB VACCINE 3 DOSE ADULT IM: CPT | Performed by: FAMILY MEDICINE

## 2024-10-10 PROCEDURE — 90472 IMMUNIZATION ADMIN EACH ADD: CPT

## 2024-10-10 PROCEDURE — 90471 IMMUNIZATION ADMIN: CPT | Performed by: FAMILY MEDICINE

## 2024-10-10 PROCEDURE — 99214 OFFICE O/P EST MOD 30 MIN: CPT | Performed by: FAMILY MEDICINE

## 2024-10-10 RX ORDER — PANTOPRAZOLE SODIUM 40 MG/1
TABLET, DELAYED RELEASE ORAL
COMMUNITY
Start: 2024-10-09 | End: 2024-10-10 | Stop reason: ALTCHOICE

## 2024-10-10 NOTE — PROGRESS NOTES
Ambulatory Visit  Name: Sha Haddad      : 1965      MRN: [id number]  Encounter Provider: KIRK Bear DO  Encounter Date: 10/10/2024   Encounter department: [facility] CARE [place]    Assessment & Plan  Encounter for immunization  Will get the Hep A and B now  Then, I have written out an immuniz schedule and given to pt. -to get here, and then some at pharmacy       Primary hypertension  Controlled for the moment, but pt will not stop drinking beer and smoking--can expect future problems       Alcoholic liver disease (HCC)  Cont's to drink.  Briefly reduced (with good reduction of LFT's) beer intake, but the summer was hot, so he stated he returned to drinking heavily,and LFT's have shot up dramatically.  Note:  ggtp was 1426!  Encouraged to stop, reduce --and redo LFT and GGTP in 3 mos  Also, f/u with hepatologist - Dr. Turpin       Panic attacks  Uses Xanax prn       Multiple medical problems  Major issues, likely most stemming from home life and physical and mental issues with wife       Vitamin D deficiency  Vit D level ONLY 12.  Long talk re that. Advised to take Vit D3 5,000 IU OD         Tobacco Cessation Counseling: Tobacco cessation counseling was provided. The patient is sincerely urged to quit consumption of tobacco. He is not ready to quit tobacco. Medication options discussed. Patient refused medication.         History of Present Illness     History of Present Illness  The patient is a 58-year-old male who presents for evaluation of multiple medical concerns.    His blood pressure is well-managed with lisinopril and amlodipine, although recent readings have been in the 140s, which he attributes to anxiety. He is currently on amlodipine 10 mg and hydrochlorothiazide for blood pressure management.    His anxiety is controlled with escitalopram and Xanax. He does not believe his nighttime cough, which produces a gurgling sound, is related to anxiety.    He experiences occasional  watery diarrhea, which he believes may be influenced by his increased salad intake. Famotidine is used for stomach issues.    He has postponed his appointment with a liver specialist, Dr. Turpin, as he was not given any specific instructions. He admits to consuming beer during the summer and has previously undergone elastography. He is unsure about his blood sugar levels.    He is currently taking vitamin D 50,000 units once a week, prescribed by Dr. Prater, with three refills remaining. He is scheduled to see a cardiologist for an EKG and has been informed that he may need dialysis for two weeks following surgery. He is not taking aspirin and is not seeing a cardiologist from [facility].    He is requesting a prescription for a hospital bed. (But he did not mention that to me?)    SOCIAL HISTORY  He smokes.     Review of Systems   Constitutional:  Negative for activity change, appetite change, chills, diaphoresis, fatigue, fever and unexpected weight change.   HENT:  Positive for congestion. Negative for dental problem, drooling, ear discharge, ear pain, facial swelling, mouth sores, nosebleeds, postnasal drip, rhinorrhea, trouble swallowing and voice change.    Eyes:  Negative for photophobia, pain, discharge, redness, itching and visual disturbance.   Respiratory:  Positive for cough and shortness of breath. Negative for apnea, choking and chest tightness.         Typical ch bronchitis; COPD  Still smoking - won't stop   Cardiovascular:  Negative for chest pain and leg swelling.   Gastrointestinal:  Negative for abdominal distention, abdominal pain, constipation, diarrhea and nausea.   Endocrine: Negative for polydipsia, polyphagia and polyuria.   Genitourinary:  Negative for decreased urine volume, difficulty urinating, dysuria, enuresis and hematuria.   Musculoskeletal:  Negative for arthralgias, back pain, gait problem and joint swelling.   Skin:  Positive for pallor and rash. Negative for color change and  "wound.   Allergic/Immunologic: Negative for immunocompromised state.   Neurological:  Negative for dizziness, seizures, syncope, facial asymmetry, speech difficulty, light-headedness and headaches.   Hematological:  Negative for adenopathy. Bruises/bleeds easily.   Psychiatric/Behavioral:  Negative for agitation, behavioral problems, confusion and decreased concentration. The patient is nervous/anxious.      Objective     /82 (BP Location: Left arm, Patient Position: Sitting, Cuff Size: Standard)   Pulse 77   Temp 97.5 °F (36.4 °C) (Temporal)   Ht 5' 9\" (1.753 m)   Wt 75 kg (165 lb 6.4 oz)   SpO2 99%   BMI 24.43 kg/m²     Physical Exam    Physical Exam  Vitals and nursing note reviewed. Exam conducted with a chaperone present.   Constitutional:       General: He is not in acute distress.     Appearance: Normal appearance. He is well-developed. He is not ill-appearing, toxic-appearing or diaphoretic.   HENT:      Head: Normocephalic and atraumatic.      Nose: Nose normal.      Mouth/Throat:      Mouth: Mucous membranes are moist.   Eyes:      Extraocular Movements: Extraocular movements intact.      Conjunctiva/sclera: Conjunctivae normal.      Pupils: Pupils are equal, round, and reactive to light.   Cardiovascular:      Rate and Rhythm: Normal rate and regular rhythm.      Pulses: Normal pulses.      Heart sounds: Normal heart sounds. No murmur heard.     No friction rub.   Pulmonary:      Effort: Pulmonary effort is normal. No respiratory distress.      Breath sounds: No stridor. Wheezing, rhonchi and rales present.   Abdominal:      General: There is no distension.      Palpations: Abdomen is soft. There is no mass.      Tenderness: There is no abdominal tenderness.   Musculoskeletal:         General: No swelling.      Cervical back: Neck supple.      Right lower leg: No edema.      Left lower leg: Edema present.   Skin:     General: Skin is warm and dry.      Coloration: Skin is not jaundiced or " pale.      Findings: No erythema or rash.   Neurological:      General: No focal deficit present.      Mental Status: He is alert and oriented to person, place, and time. Mental status is at baseline.   Psychiatric:         Mood and Affect: Mood normal.         Behavior: Behavior normal.         Thought Content: Thought content normal.         Judgment: Judgment normal.       Administrative Statements   I have spent a total time of 30 minutes in caring for this patient on the day of the visit/encounter including Diagnostic results, Prognosis, Risks and benefits of tx options, Instructions for management, Patient and family education, Importance of tx compliance, Risk factor reductions, Impressions, Documenting in the medical record, Reviewing / ordering tests, medicine, procedures  , and Obtaining or reviewing history  .

## 2024-10-10 NOTE — ASSESSMENT & PLAN NOTE
Cont's to drink.  Briefly reduced (with good reduction of LFT's) beer intake, but the summer was hot, so he stated he returned to drinking heavily,and LFT's have shot up dramatically.  Note:  ggtp was 1426!  Encouraged to stop, reduce --and redo LFT and GGTP in 3 mos  Also, f/u with hepatologist - Dr. Turpin

## 2024-10-10 NOTE — ASSESSMENT & PLAN NOTE
Controlled for the moment, but pt will not stop drinking beer and smoking--can expect future problems

## 2024-10-10 NOTE — ASSESSMENT & PLAN NOTE
Vit D level ONLY 12.  Long talk re that. Advised to take Vit D3 5,000 IU OD        Statement Selected

## 2024-10-10 NOTE — TELEPHONE ENCOUNTER
Called and was able to speak w wife -- pt has been experiencing intermittent loose stools. Will be addressed at time of OV later this afternoon.

## 2024-10-11 ENCOUNTER — NURSE TRIAGE (OUTPATIENT)
Dept: OTHER | Facility: OTHER | Age: 59
End: 2024-10-11

## 2024-10-11 NOTE — TELEPHONE ENCOUNTER
"Reason for Disposition   [1] SEVERE vomiting (e.g., 6 or more times/day) AND [2] present > 8 hours (Exception: Patient sounds well, is drinking liquids, does not sound dehydrated, and vomiting has lasted less than 24 hours.)    Answer Assessment - Initial Assessment Questions  1. VOMITING SEVERITY: \"How many times have you vomited in the past 24 hours?\"       About more than 5 times   2. ONSET: \"When did the vomiting begin?\"       Vomiting started this morning, diarrhea started a few days ago   3. FLUIDS: \"What fluids or food have you vomited up today?\" \"Have you been able to keep any fluids down?\"      Has not been able to keep any fluids down today  4. ABDOMEN PAIN: \"Are your having any abdomen pain?\" If Yes : \"How bad is it and what does it feel like?\" (e.g., crampy, dull, intermittent, constant)       No abdominal pain just discomfort when vomiting/diarrhea   5. DIARRHEA: \"Is there any diarrhea?\" If Yes, ask: \"How many times today?\"       Per patient only twice today, per wife has been going all day today   6. CONTACTS: \"Is there anyone else in the family with the same symptoms?\"       No  7. CAUSE: \"What do you think is causing your vomiting?\"      Had Hep A/B vaccine yesterday   8. HYDRATION STATUS: \"Any signs of dehydration?\" (e.g., dry mouth [not only dry lips], too weak to stand) \"When did you last urinate?\"      Weakness, pale,   9. OTHER SYMPTOMS: \"Do you have any other symptoms?\" (e.g., fever, headache, vertigo, vomiting blood or coffee grounds, recent head injury)      Unable to assess, patient actively throwing up while wife is answering questions.    Protocols used: Vomiting-Adult-AH    "

## 2024-10-12 NOTE — TELEPHONE ENCOUNTER
Patient's wife calling with concern of patients symptoms vomiting more than 6 times today, diarrhea all day. Patient is unable to keep fluids down, seems weak and skin is slightly pale. Advised that patient should be evaluated in the ED ASAP but patient can be heard in the background and is refusing. Advised caller to attempt to convince patient to go to ED, or to call 911 if patient becomes less responsive, unable to stand, or cool/clammy. Caller understands and plans to do so.

## 2024-10-14 NOTE — TELEPHONE ENCOUNTER
Called and spoke w pt -- he states that he feels much better -- he is staying well hydrated. He is not sure if he had viral GI bug vs interaction w either Hep A/Hep B immunizations received but reports no issues w any of the previous inj. He will notify office if any Sx appear.

## 2024-10-21 ENCOUNTER — VBI (OUTPATIENT)
Dept: ADMINISTRATIVE | Facility: OTHER | Age: 59
End: 2024-10-21

## 2024-10-21 NOTE — TELEPHONE ENCOUNTER
10/21/24 1:13 PM     Chart reviewed for   Controlling High Blood Pressure    was/were submitted to the patient's insurance.     Basia Patel   [facility]

## 2024-11-26 DIAGNOSIS — F41.9 ANXIETY: ICD-10-CM

## 2024-11-26 DIAGNOSIS — K21.00 GASTROESOPHAGEAL REFLUX DISEASE WITH ESOPHAGITIS WITHOUT HEMORRHAGE: ICD-10-CM

## 2024-11-26 DIAGNOSIS — I10 HYPERTENSION, UNSPECIFIED TYPE: ICD-10-CM

## 2024-11-26 NOTE — TELEPHONE ENCOUNTER
Spoke w pt wife -- she reports that patients anxiety has been higher recently due to [place]'s health concerns. He has been nauseous and vomiting daily. He has began using Xanax 0.25 mg daily to relieve the anxiety re health conditions. She is wondering if he is able to take the prescription daily as opposed to on a PRN basis.

## 2024-11-26 NOTE — TELEPHONE ENCOUNTER
Wife called requesting to speak with Geronimo.Warm transferred to the office to speak with Geronimo to assist with patients needs.

## 2024-11-27 RX ORDER — ALPRAZOLAM 0.25 MG/1
0.25 TABLET ORAL
Qty: 30 TABLET | Refills: 1 | Status: SHIPPED | OUTPATIENT
Start: 2024-11-27

## 2024-11-27 RX ORDER — PANTOPRAZOLE SODIUM 40 MG/1
TABLET, DELAYED RELEASE ORAL
Qty: 30 TABLET | Refills: 5 | Status: SHIPPED | OUTPATIENT
Start: 2024-11-27

## 2024-11-27 RX ORDER — LISINOPRIL 20 MG/1
20 TABLET ORAL DAILY
Qty: 90 TABLET | Refills: 1 | Status: SHIPPED | OUTPATIENT
Start: 2024-11-27

## 2024-11-27 NOTE — TELEPHONE ENCOUNTER
Called and spoke w pt -- he is going to try taking 1 tablet/d for time being and then transition to a PRN basis after period of high stress and anxiety passes. He is in need of refill so it has been queued at this time and routed to PCP.

## 2024-12-11 DIAGNOSIS — F32.A DEPRESSION, UNSPECIFIED DEPRESSION TYPE: ICD-10-CM

## 2024-12-12 RX ORDER — ESCITALOPRAM OXALATE 20 MG/1
20 TABLET ORAL DAILY
Qty: 30 TABLET | Refills: 5 | Status: SHIPPED | OUTPATIENT
Start: 2024-12-12

## 2025-01-09 ENCOUNTER — OFFICE VISIT (OUTPATIENT)
Dept: FAMILY MEDICINE CLINIC | Facility: CLINIC | Age: 60
End: 2025-01-09
Payer: COMMERCIAL

## 2025-01-09 VITALS
OXYGEN SATURATION: 98 % | WEIGHT: 161.6 LBS | TEMPERATURE: 97.8 F | SYSTOLIC BLOOD PRESSURE: 142 MMHG | HEIGHT: 69 IN | HEART RATE: 75 BPM | DIASTOLIC BLOOD PRESSURE: 82 MMHG | BODY MASS INDEX: 23.93 KG/M2

## 2025-01-09 DIAGNOSIS — R53.82 CHRONIC FATIGUE: ICD-10-CM

## 2025-01-09 DIAGNOSIS — Z79.899 MEDICATION MANAGEMENT: ICD-10-CM

## 2025-01-09 DIAGNOSIS — Z13.89 SCREENING FOR HEMATURIA OR PROTEINURIA: ICD-10-CM

## 2025-01-09 DIAGNOSIS — E78.2 MIXED HYPERLIPIDEMIA: ICD-10-CM

## 2025-01-09 DIAGNOSIS — F32.A DEPRESSION, UNSPECIFIED DEPRESSION TYPE: ICD-10-CM

## 2025-01-09 DIAGNOSIS — R79.89 ELEVATED LFTS: Primary | ICD-10-CM

## 2025-01-09 DIAGNOSIS — Z13.89 SCREENING FOR CARDIOVASCULAR, RESPIRATORY, AND GENITOURINARY DISEASES: ICD-10-CM

## 2025-01-09 DIAGNOSIS — I10 ESSENTIAL HYPERTENSION: ICD-10-CM

## 2025-01-09 DIAGNOSIS — Z13.83 SCREENING FOR CARDIOVASCULAR, RESPIRATORY, AND GENITOURINARY DISEASES: ICD-10-CM

## 2025-01-09 DIAGNOSIS — E55.9 VITAMIN D DEFICIENCY: ICD-10-CM

## 2025-01-09 DIAGNOSIS — K70.9 ALCOHOLIC LIVER DISEASE (HCC): ICD-10-CM

## 2025-01-09 DIAGNOSIS — Z71.2 ENCOUNTER TO DISCUSS TEST RESULTS: ICD-10-CM

## 2025-01-09 DIAGNOSIS — Z13.6 SCREENING FOR CARDIOVASCULAR, RESPIRATORY, AND GENITOURINARY DISEASES: ICD-10-CM

## 2025-01-09 PROCEDURE — 99215 OFFICE O/P EST HI 40 MIN: CPT | Performed by: FAMILY MEDICINE

## 2025-01-09 NOTE — ASSESSMENT & PLAN NOTE
Marked elevation of cholesterol and triglyceride cholesterol 256 triglycerides 810 up from 40 in January 2023 and 35 in September 2022.  We discussed this in great detail and with sincere concern that he must modify his diet to bring this under control he is not on statin therapy because of the current issues associated with his liver enzymes  Orders:    Lipid panel; Future

## 2025-01-09 NOTE — ASSESSMENT & PLAN NOTE
True vitamin D deficiency on 2 occasions vitamin D level 14.8 and 12 is taking vitamin D 5000 IU once daily and encouraged to continue  Orders:    Vitamin D 25 hydroxy; Future    Cholecalciferol 125 MCG (5000 UT) capsule; Take 1 capsule (5,000 Units total) by mouth daily

## 2025-01-09 NOTE — PROGRESS NOTES
Name: Sha Haddad      : 1965      MRN: [id number]  Encounter Provider: KIRK Bear DO  Encounter Date: 2025   Encounter department: [facility] [place]    Assessment & Plan  Elevated LFTs  Note significant elevation of GGTP from 974 in 2024 up to 1426 in 2024, and note AST and ALT up to 307 and 167 from 34 and 43==> once again advised to abstain from all alcohol and cigarettes continue vitamins etc. I reviewed with he and his wife who was also present today his last ultrasound of the liver and MRI of abdomen, as well as ultrasound elastography, UGA P done 2024 results are:    IMPRESSION:     Metavir score: F2, Significant Fibrosis.     According to the updated SRU consensus statement, a liver stiffness of 8.79 kPa, which in the absence of other known clinical signs*, rules out compensated advanced chronic liver disease (cACLD). If there are known clinical signs, may need further test   for confirmation.  https://pubs.rsna.org/doi/10.1148/radiol.7285314142     Liver steatosis grading: S1 ( 5%-33% steatosis)  Orders:    Gamma GT; Future    Encounter to discuss test results  See above all labs reviewed and my very worried concern over elevated LFTs expressed and conveyed to both he and his wife       Alcoholic liver disease (HCC)  See comments above and imaging no       Chronic fatigue    Orders:    CBC; Future    TSH, 3rd generation; Future    Mixed hyperlipidemia  Marked elevation of cholesterol and triglyceride cholesterol 256 triglycerides 810 up from 40 in 2023 and 35 in 2022.  We discussed this in great detail and with sincere concern that he must modify his diet to bring this under control he is not on statin therapy because of the current issues associated with his liver enzymes  Orders:    Lipid panel; Future    Medication management  All medications reviewed for safety efficacy tolerance and compliance       Screening for hematuria or  proteinuria    Orders:    UA w Reflex to Microscopic w Reflex to Culture    Albumin / creatinine urine ratio    Screening for cardiovascular, respiratory, and genitourinary diseases    Orders:    Comprehensive metabolic panel; Future    Vitamin D deficiency  True vitamin D deficiency on 2 occasions vitamin D level 14.8 and 12 is taking vitamin D 5000 IU once daily and encouraged to continue  Orders:    Vitamin D 25 hydroxy; Future    Cholecalciferol 125 MCG (5000 UT) capsule; Take 1 capsule (5,000 Units total) by mouth daily    Essential hypertension  Blood pressure 142/82 should respond to low salt and marked reduction in alcohol.       Depression, unspecified depression type    My sincere concern for his alcoholism and COPD, nicotine dependence etc.  My concern is that I am not sure he has the motivation to change the         Assessment & Plan  1. Hypertension - Blood pressure readings consistently exceed the desired range, currently at 142.  - Monitor blood pressure at home and report readings consistently over 140.  - Increase lisinopril dosage to 30 or 40 mg if blood pressure remains high.    2. Acid Reflux - Currently taking famotidine 40 mg at bedtime and pantoprazole 40 mg in the morning.  - Continue current medication regimen to control symptoms.  - Educated on the importance of maintaining some stomach acid for nutrient absorption.    3. Elevated liver function tests - Significant increase in GGTP, AST, and ALT levels; bilirubin also increased.  - Ultrasound showed no concerning lesions but indicated fatty changes and simple liver cysts.  - Advised to abstain from all alcohol and cigarettes.  - Repeat GGTP test will be ordered.  - Continue taking vitamins and supplements.    4. Vitamin D Deficiency - Diagnosed with true vitamin D deficiency.  - Continue taking vitamin D 5000 IU once daily.    5. Hyperlipidemia - Marked increase in cholesterol and triglyceride levels.  - Advised to modify diet to control  levels.  - Statin therapy not recommended due to liver enzyme issues.    6. Chronic Fatigue - Advised to continue taking vitamins and supplements.    Follow-up  - Follow up in 3 months.    Tobacco Cessation Counseling: Tobacco cessation counseling was provided. The patient is sincerely urged to quit consumption of tobacco. He is not ready to quit tobacco.         History of Present Illness     History of Present Illness  The patient is a 59-year-old male who presents for evaluation of elevated blood pressure, acid reflux, fatty liver, and vitamin D deficiency. He is accompanied by his wife.    Elevated Blood Pressure  - He does not monitor his blood pressure at home, despite having a blood pressure cuff available.  - He is currently on amlodipine 5 mg and lisinopril for hypertension management.    Acid Reflux  - He reports experiencing vomiting episodes, which he attributes to acid reflux.  - These episodes are followed by a period of relief.  - His gastrointestinal symptoms are well-managed with his current medication regimen, allowing him to maintain a normal diet.  - He is on famotidine 40 mg at bedtime and pantoprazole in the morning, taken at least 30 minutes prior to food intake.    Fatty Liver  - He has a history of alcohol consumption, specifically beer, but has ceased this habit.  - He has not yet undergone a colonoscopy or consulted a hepatologist.    Vitamin D Deficiency  - He is currently taking vitamin D supplements and a daily multivitamin.    Supplemental information: He has been prescribed escitalopram and Xanax, the latter of which he takes nightly to aid sleep. He is also on fluticasone for nasal symptoms, which are stable.    SOCIAL HISTORY  The patient stopped drinking alcohol. He admits to smoking.    MEDICATIONS  Current: Amlodipine, lisinopril, escitalopram, Xanax, famotidine, pantoprazole, fluticasone, vitamin D     Review of Systems   Constitutional:  Negative for activity change, appetite  "change, chills, diaphoresis, fatigue, fever and unexpected weight change.   HENT:  Negative for congestion, dental problem, drooling, ear discharge, ear pain, facial swelling, mouth sores, nosebleeds, postnasal drip, rhinorrhea, trouble swallowing and voice change.    Eyes:  Negative for photophobia, pain, discharge, redness, itching and visual disturbance.   Respiratory:  Negative for apnea, cough, choking, chest tightness and shortness of breath.         Typical ch bronchitis; COPD  Still smoking - won't stop   Cardiovascular:  Negative for chest pain and leg swelling.   Gastrointestinal:  Negative for abdominal distention, abdominal pain, constipation, diarrhea and nausea.   Endocrine: Negative for polydipsia, polyphagia and polyuria.   Genitourinary:  Negative for decreased urine volume, difficulty urinating, dysuria, enuresis and hematuria.   Musculoskeletal:  Negative for arthralgias, back pain, gait problem and joint swelling.   Skin:  Negative for color change, pallor, rash and wound.   Allergic/Immunologic: Negative for immunocompromised state.   Neurological:  Negative for dizziness, seizures, syncope, facial asymmetry, speech difficulty, light-headedness and headaches.   Hematological:  Negative for adenopathy. Bruises/bleeds easily.   Psychiatric/Behavioral:  Negative for agitation, behavioral problems, confusion and decreased concentration. The patient is nervous/anxious.      Objective   /82 (BP Location: Left arm, Patient Position: Sitting, Cuff Size: Standard)   Pulse 75   Temp 97.8 °F (36.6 °C) (Temporal)   Ht 5' 9\" (1.753 m)   Wt 73.3 kg (161 lb 9.6 oz)   SpO2 98%   BMI 23.86 kg/m²     Physical Exam  Lungs were auscultated.    Vital Signs  Blood pressure is 142 systolic. Weight is 161 pounds.  Physical Exam  Vitals and nursing note reviewed.   Constitutional:       General: He is not in acute distress.     Appearance: Normal appearance. He is well-developed. He is not ill-appearing, " toxic-appearing or diaphoretic.   HENT:      Head: Normocephalic and atraumatic.      Nose: Nose normal.      Mouth/Throat:      Mouth: Mucous membranes are moist.   Eyes:      Extraocular Movements: Extraocular movements intact.      Conjunctiva/sclera: Conjunctivae normal.      Pupils: Pupils are equal, round, and reactive to light.   Cardiovascular:      Rate and Rhythm: Normal rate and regular rhythm.      Pulses: Normal pulses.      Heart sounds: Normal heart sounds. No murmur heard.     No friction rub.   Pulmonary:      Effort: Pulmonary effort is normal. No respiratory distress.      Breath sounds: Normal breath sounds. No stridor. No wheezing or rhonchi.   Abdominal:      Palpations: Abdomen is soft.      Tenderness: There is no abdominal tenderness.   Musculoskeletal:         General: No swelling.      Cervical back: Neck supple.   Skin:     General: Skin is warm and dry.      Coloration: Skin is not jaundiced or pale.      Findings: No erythema or rash.   Neurological:      General: No focal deficit present.      Mental Status: He is alert and oriented to person, place, and time. Mental status is at baseline.   Psychiatric:         Mood and Affect: Mood normal.         Behavior: Behavior normal.         Thought Content: Thought content normal.         Judgment: Judgment normal.       Administrative Statements   I have spent a total time of 45 minutes in caring for this patient on the day of the visit/encounter including Diagnostic results, Prognosis, Risks and benefits of tx options, Instructions for management, Patient and family education, Importance of tx compliance, Risk factor reductions, Impressions, Counseling / Coordination of care, Documenting in the medical record, Reviewing / ordering tests, medicine, procedures  , and Obtaining or reviewing history  .

## 2025-03-06 DIAGNOSIS — J40 BRONCHITIS: Primary | ICD-10-CM

## 2025-03-06 RX ORDER — AZITHROMYCIN 250 MG/1
TABLET, FILM COATED ORAL
Qty: 6 TABLET | Refills: 0 | Status: SHIPPED | OUTPATIENT
Start: 2025-03-06 | End: 2025-03-11

## 2025-03-06 NOTE — TELEPHONE ENCOUNTER
Called and spoke w pt at length -- he states he is taking OTC mucinex as directed w minimal relief. He will try to increase fluid intake in order to loosen mucous as this has been ongoing x 1 month. I did advise he be seen in office today/tomorrow but pt is not willing at time of call. Did discuss w him possible need for CXR but patient states Sx only bother him at HS. Sha is wondering what else he would be able to take OTC aside from Mucinex to clear up chest congestion. He will keep office up to date as to Sx.

## 2025-03-06 NOTE — TELEPHONE ENCOUNTER
Called and discussed w pt provider recommendations. Pt agreeable to doing so and will call office should there be any questions or concerns. NFA needed.     Zpak queued and routed. CXR placed at this time.

## 2025-03-06 NOTE — TELEPHONE ENCOUNTER
Pt wife called, he has been coughing x 1 month.  Coughing up a lot of phlegm, she stated it is a crackling cough that is constant and keeping him up at night.    Using Mucinex, no help.  Wanted to speak with [facility], attempted to warm transfer to clinical, they were unavailable.    Attempted to make an appt with covering provider in office, patient refused.    Wife does want him seen but patient will only see his PCP.  Please advise.

## 2025-03-07 DIAGNOSIS — I10 ESSENTIAL HYPERTENSION: ICD-10-CM

## 2025-03-07 RX ORDER — AMLODIPINE BESYLATE 5 MG/1
5 TABLET ORAL DAILY
Qty: 90 TABLET | Refills: 1 | Status: SHIPPED | OUTPATIENT
Start: 2025-03-07

## 2025-06-13 ENCOUNTER — PATIENT OUTREACH (OUTPATIENT)
Dept: CASE MANAGEMENT | Facility: OTHER | Age: 60
End: 2025-06-13

## 2025-06-13 DIAGNOSIS — Z59.86 FINANCIAL INSECURITY: Primary | ICD-10-CM

## 2025-06-13 SDOH — ECONOMIC STABILITY - INCOME SECURITY: FINANCIAL INSECURITY: Z59.86

## 2025-06-13 NOTE — PROGRESS NOTES
Received IB from [facility] Emilia requesting SW referral to assist with SSI/SSD application and possible MH resources. Referral placed and routing message to MA payer pod as fyi.

## 2025-06-17 ENCOUNTER — PATIENT OUTREACH (OUTPATIENT)
Dept: CASE MANAGEMENT | Facility: OTHER | Age: 60
End: 2025-06-17

## 2025-06-17 NOTE — PROGRESS NOTES
JESSENIA ZIMMER received referral and reviewed chart. OP CM OC, Emilia, requested JESSENIA ZIMMER f/u with pt to assist with applying for disability and potentially OP MH services.     JESSENIA ZIMMER spoke with CM OC about case. CM OC shared that she is assisting pt's wife and pt would benefit from assistance as well.    JESSENIA ZIMMER placed call to pt and introduced self. Pt was agreeable to outreach. Pt stated that MH services were brought up in conversation with CM OC. JESSENIA ZIMMER asked pt if he would like to see a therapist or psychiatrist. Pt stated he does not wish to see a therapist or a psychiatrist at this time. Pt stated he has dealt with a lot of loss over the last few years. JESSENIA ZIMMER encouraged pt to let her know if he would like assistance with identifying providers. Pt was agreeable.    Pt stated he has spoken with GORAN OC as she is going to help pt with paperwork for disability application. JESSENIA ZIMMER logged referral for SSDI in findhelp.    Pt stated he has no other needs for JESSENIA ZIMMER to assist with at this time.    JESSENIA ZIMMER placed referral for CM OC to assist pt. Chat message sent to CM OC Emilia. JESSENIA ZIMMER added CM OC to care plan tasks. JESSENIA ZIMMER will continue to follow.

## 2025-06-19 ENCOUNTER — PATIENT OUTREACH (OUTPATIENT)
Dept: CASE MANAGEMENT | Facility: HOSPITAL | Age: 60
End: 2025-06-19

## 2025-06-19 NOTE — PROGRESS NOTES
CMOC received patient referral from [facility] JESSENIA. CHW referral for financial insecurities. In basket message to [facility] requesting CMOC be added to Care Team. In basket confirmation received. Chart review completed.    CMOC called patient, introduced myself and explained my role. We discussed the referral that was received and patient accepts CMOC's services.      Patient unemployed since 4/2023.  No income of his own. Patient wife receives SSD, and is the only income for the 2 family home. Patient does have MA and receives SNAP benefits.  Patient was referred on Findhelp to SSDI (program) for money.    Patient does not uses internet, therefore it is recommend that he call [phone] to [facility] to complete application with [facility].    Next outreach in one days time.

## 2025-06-20 ENCOUNTER — PATIENT OUTREACH (OUTPATIENT)
Dept: CASE MANAGEMENT | Facility: HOSPITAL | Age: 60
End: 2025-06-20

## 2025-06-20 NOTE — PROGRESS NOTES
Attempted to contact pt to schedule fu appt.. Lm on pts vm. Thyroid us order deferred until 4/01//2023.   FRANCISCO completed a chart review prior to calling patient.     FRANCISCO spoke to Maya patients wife, she agrees starting the SSDI application by scheduling appointment with [employer] is the best way to complete application. Patient wrote own telephone number [phone],she and Sha will call next week.     CMOC will follow up in one weeks time, depending on date of application CMOC may assist with documentation.

## 2025-06-23 ENCOUNTER — PATIENT OUTREACH (OUTPATIENT)
Dept: CASE MANAGEMENT | Facility: HOSPITAL | Age: 60
End: 2025-06-23

## 2025-06-23 NOTE — PROGRESS NOTES
"Call from spouse requesting a return call.    Patient has not yet called [employer]. \"Forgot what to say\".    CMOC will help patient call [employer] tomorrow during home visit 6/24/25 at 10 am.    COVID screening completed.  "

## 2025-06-24 ENCOUNTER — PATIENT OUTREACH (OUTPATIENT)
Dept: CASE MANAGEMENT | Facility: HOSPITAL | Age: 60
End: 2025-06-24

## 2025-06-25 ENCOUNTER — PATIENT OUTREACH (OUTPATIENT)
Dept: CASE MANAGEMENT | Facility: HOSPITAL | Age: 60
End: 2025-06-25

## 2025-06-26 ENCOUNTER — PATIENT OUTREACH (OUTPATIENT)
Dept: CASE MANAGEMENT | Facility: HOSPITAL | Age: 60
End: 2025-06-26

## 2025-06-26 NOTE — PROGRESS NOTES
Home visit completed.    Together we called SSDI, the hold time was greater then 80 minutes, we opted for return call.    Call from spouse, SSDI will do a telephone interview with Sha 6/26/25 @11 am.  Patient would like help with medical doctors name and address.    Next outreach 6/26/25

## 2025-06-26 NOTE — PROGRESS NOTES
Call to patient to provide the names and addresses of his medical doctors.    CMOC called patient, left voice message explaining the purpose of my call. CMOC requested a return call.    If no return call is received CMOC will outreach patient in one days time.

## 2025-06-27 ENCOUNTER — PATIENT OUTREACH (OUTPATIENT)
Dept: CASE MANAGEMENT | Facility: HOSPITAL | Age: 60
End: 2025-06-27

## 2025-06-27 NOTE — PROGRESS NOTES
FRANCISCO completed a chart review prior to calling patient.     Application #[id number]  for renewal of SNAP and MA completed. [facility] will work with wife to submit verification documents.    Patient completed his application for SSD over the telephone yesterday. Patient said he will receive additional information in the mail. No CHW needed for SSD today.    Next outreach 7/8/25

## 2025-07-08 ENCOUNTER — PATIENT OUTREACH (OUTPATIENT)
Dept: CASE MANAGEMENT | Facility: HOSPITAL | Age: 60
End: 2025-07-08

## 2025-07-08 NOTE — PROGRESS NOTES
CMOC completed a chart review prior to calling patient.     CMOC review letter from [facility], that I received from patients wife.    Letter was acknowledgement that patient did not choose a representative payee to mange SS benefits during the application process.    Call to patient to explain the above information.Patient will file letter away.    No additional CHW needs today.    CMOC will follow up in 2 weeks time.

## 2025-07-09 DIAGNOSIS — K21.9 GASTROESOPHAGEAL REFLUX DISEASE WITHOUT ESOPHAGITIS: ICD-10-CM

## 2025-07-09 RX ORDER — FAMOTIDINE 40 MG/1
40 TABLET, FILM COATED ORAL
Qty: 90 TABLET | Refills: 3 | Status: SHIPPED | OUTPATIENT
Start: 2025-07-09

## 2025-07-11 ENCOUNTER — PATIENT OUTREACH (OUTPATIENT)
Dept: CASE MANAGEMENT | Facility: HOSPITAL | Age: 60
End: 2025-07-11

## 2025-07-11 NOTE — PROGRESS NOTES
Call from patients wife. Patient received a telephone call from [facility]. They are sending paperwork to be completed. Patient requesting help from CMOC. Patient will call CMOC when paperwork arrives.    CMOC will follow up in one weeks time, if no contact prior to that date.

## 2025-07-17 ENCOUNTER — PATIENT OUTREACH (OUTPATIENT)
Dept: CASE MANAGEMENT | Facility: OTHER | Age: 60
End: 2025-07-17

## 2025-07-17 NOTE — PROGRESS NOTES
JESSENIA ZIMMER reviewed chart and updated care plan. GORAN OC has been providing support to pt and spouse with applying for disability. GORAN OC also assisted with renewing SNAP and MA.    JESSENIA ZIMMER placed call to pt to f/u to discuss any needs. Pt did not answer. JESSENIA ZIMMER left a message. JESSENIA ZIMMER will outreach within 30 days and is available to assist with any needs.

## 2025-07-18 ENCOUNTER — PATIENT OUTREACH (OUTPATIENT)
Dept: CASE MANAGEMENT | Facility: HOSPITAL | Age: 60
End: 2025-07-18

## 2025-07-18 NOTE — PROGRESS NOTES
Care Management  outreached patient to confirm home visit date and time.  Patient is needing to reschedule the home visit.  CMOC and patient rescheduled home visit for 7/25/25 at 10 am.. Update routed to Care Manager.    Care Management  provided update to Care Manager and scheduled Next Outreach with patient to follow-up on Financial Assistance  SSDI..

## 2025-07-24 ENCOUNTER — PATIENT OUTREACH (OUTPATIENT)
Dept: CASE MANAGEMENT | Facility: HOSPITAL | Age: 60
End: 2025-07-24

## 2025-07-24 NOTE — PROGRESS NOTES
Reviewed Care Plan Tasks assigned by Care Manager prior to outreaching the patient.     Care Management  outreached patient to confirm home visit date and time.  Patient confirmed planned home visit and CMOC confirmed that there are no changes to the previous COVID Screening.  Update routed to Care Manager.

## 2025-07-25 ENCOUNTER — PATIENT OUTREACH (OUTPATIENT)
Dept: CASE MANAGEMENT | Facility: HOSPITAL | Age: 60
End: 2025-07-25

## 2025-07-25 NOTE — PROGRESS NOTES
Reviewed Care Plan Tasks assigned by Care Manager prior to outreaching the patient.     Completed home visit due to Assisting with application completion for SSI.CMOC made photo Copies for patient, [facility]    Care Management  provided update to Care Manager and scheduled Next Outreach for [id number] with patient to follow-up on Financial Assistance.

## 2025-08-01 ENCOUNTER — PATIENT OUTREACH (OUTPATIENT)
Dept: CASE MANAGEMENT | Facility: HOSPITAL | Age: 60
End: 2025-08-01

## 2025-08-18 ENCOUNTER — PATIENT OUTREACH (OUTPATIENT)
Dept: CASE MANAGEMENT | Facility: OTHER | Age: 60
End: 2025-08-18